# Patient Record
Sex: FEMALE | Race: WHITE | Employment: FULL TIME | URBAN - METROPOLITAN AREA
[De-identification: names, ages, dates, MRNs, and addresses within clinical notes are randomized per-mention and may not be internally consistent; named-entity substitution may affect disease eponyms.]

---

## 2018-10-31 ENCOUNTER — HOSPITAL ENCOUNTER (OUTPATIENT)
Age: 58
Discharge: HOME | End: 2018-11-02
Payer: COMMERCIAL

## 2018-10-31 PROCEDURE — G0123 SCREEN CERV/VAG THIN LAYER: HCPCS

## 2018-10-31 PROCEDURE — 87624 HPV HI-RISK TYP POOLED RSLT: CPT

## 2018-11-07 LAB
CORRESPONDING PAP CASE #: NORMAL
HPV, HIGH RISK: NEGATIVE

## 2019-05-10 ENCOUNTER — HOSPITAL ENCOUNTER (OUTPATIENT)
Dept: GENERAL RADIOLOGY | Age: 59
Discharge: HOME | End: 2019-05-12
Payer: COMMERCIAL

## 2019-05-10 ENCOUNTER — HOSPITAL ENCOUNTER (OUTPATIENT)
Age: 59
Discharge: HOME | End: 2019-05-12
Payer: COMMERCIAL

## 2019-05-10 DIAGNOSIS — M54.5 LOW BACK PAIN, UNSPECIFIED BACK PAIN LATERALITY, UNSPECIFIED CHRONICITY, WITH SCIATICA PRESENCE UNSPECIFIED: ICD-10-CM

## 2019-05-10 PROCEDURE — 72100 X-RAY EXAM L-S SPINE 2/3 VWS: CPT

## 2020-09-29 ENCOUNTER — HOSPITAL ENCOUNTER (OUTPATIENT)
Age: 60
Discharge: HOME | End: 2020-10-01
Payer: COMMERCIAL

## 2020-09-29 PROCEDURE — 87624 HPV HI-RISK TYP POOLED RSLT: CPT

## 2020-09-29 PROCEDURE — G0123 SCREEN CERV/VAG THIN LAYER: HCPCS

## 2020-10-03 LAB
HPV SAMPLE: NORMAL
HPV TYPE 16: NOT DETECTED
HPV TYPE 18: NOT DETECTED
HPV, HIGH RISK OTHER: NOT DETECTED
INTERPRETATION: NORMAL
SOURCE: NORMAL

## 2021-10-26 ENCOUNTER — TELEPHONE (OUTPATIENT)
Dept: FAMILY MEDICINE CLINIC | Age: 61
End: 2021-10-26

## 2021-10-26 NOTE — TELEPHONE ENCOUNTER
----- Message from [employer] / [employer] sent at 10/26/2021 12:04 PM EDT -----  Subject: Appointment Request    Reason for Call: New Patient Request Appointment    QUESTIONS  Type of Appointment? New Patient/New to Provider  Reason for appointment request? No appointments available during search  Additional Information for Provider? pt called in with concerns of   establishing care with dr. Mary Whitman on the website it states taking   new patient however when I did search there is no availablity and pt is   upset about that. .. pt can be reached at [phone]   ---------------------------------------------------------------------------  --------------  [address]  What is the best way for the office to contact you? OK to leave message on   voicemail  Preferred Call Back Phone Number? [phone]  ---------------------------------------------------------------------------  --------------  SCRIPT ANSWERS  Relationship to Patient? Self  Specialty Confirmation? Primary Care  Is this the first appointment to establish care for a ? No  Have you been diagnosed with, awaiting test results for, or told that you   are suspected of having COVID-19 (Coronavirus)? (If patient has tested   negative or was tested as a requirement for work, school, or travel and   not based on symptoms, answer no)? No  Within the past two weeks have you developed any of the following symptoms   (answer no if symptoms have been present longer than 2 weeks or began   more than 2 weeks ago)? Fever or Chills, Cough, Shortness of breath or   difficulty breathing, Loss of taste or smell, Sore throat, Nasal   congestion, Sneezing or runny nose, Fatigue or generalized body aches   (answer no if pain is specific to a body part e.g. back pain), Diarrhea,   Headache? No  Have you had close contact with someone with COVID-19 in the last 14 days? No  (Service Expert - click yes below to proceed with [employer] As Usual   Scheduling)?  Yes

## 2021-10-26 NOTE — TELEPHONE ENCOUNTER
----- Message from Jacinto Reinoso sent at 10/26/2021 10:02 AM EDT -----  Subject: Appointment Request    Reason for Call: New Patient Request Appointment    QUESTIONS  Type of Appointment? New Patient/New to Provider  Reason for appointment request? Requested Provider unavailable - Dr Castelan Reason for Provider? Pt is going to be a new patient. She   is requesting an appt with Dr. Sharita Bourgeois at the end of December. She will be   making an appt with her spouse, Pierre Gutierrez. They will need to   come together as English is his second language. ---------------------------------------------------------------------------  --------------  [facility] [place] INFO  What is the best way for the office to contact you? OK to leave message on   voicemail  Preferred Call Back Phone Number? [phone]  ---------------------------------------------------------------------------  --------------  SCRIPT ANSWERS  Relationship to Patient? Self  Specialty Confirmation? Primary Care  Is this the first appointment to establish care for a ? No  Have you been diagnosed with, awaiting test results for, or told that you   are suspected of having COVID-19 (Coronavirus)? (If patient has tested   negative or was tested as a requirement for work, school, or travel and   not based on symptoms, answer no)? No  Within the past two weeks have you developed any of the following symptoms   (answer no if symptoms have been present longer than 2 weeks or began   more than 2 weeks ago)? Fever or Chills, Cough, Shortness of breath or   difficulty breathing, Loss of taste or smell, Sore throat, Nasal   congestion, Sneezing or runny nose, Fatigue or generalized body aches   (answer no if pain is specific to a body part e.g. back pain), Diarrhea,   Headache? No  Have you had close contact with someone with COVID-19 in the last 14 days? No  (Service Expert - click yes below to proceed with [employer] As Usual   Scheduling)?  Yes  Have you been diagnosed with, awaiting test results for, or told that you   are suspected of having COVID-19 (Coronavirus)? (If patient has tested   negative or was tested as a requirement for work, school, or travel and   not based on symptoms, answer no)? No  Within the past two weeks have you developed any of the following symptoms   (answer no if symptoms have been present longer than 2 weeks or began   more than 2 weeks ago)? Fever or Chills, Cough, Shortness of breath or   difficulty breathing, Loss of taste or smell, Sore throat, Nasal   congestion, Sneezing or runny nose, Fatigue or generalized body aches   (answer no if pain is specific to a body part e.g. back pain), Diarrhea,   Headache? No  Have you had close contact with someone with COVID-19 in the last 14 days? No  (Service Expert - click yes below to proceed with [employer] As Usual   Scheduling)?  Yes

## 2021-10-27 NOTE — TELEPHONE ENCOUNTER
Per Dr Joe Carroll: Patient can be scheduled with another provider     Called and informed pt. Provided her with names of providers accepting new patients and pt says she'll do her research and call back if interested.

## 2023-02-06 SDOH — HEALTH STABILITY: PHYSICAL HEALTH: ON AVERAGE, HOW MANY MINUTES DO YOU ENGAGE IN EXERCISE AT THIS LEVEL?: 40 MIN

## 2023-02-06 SDOH — HEALTH STABILITY: PHYSICAL HEALTH: ON AVERAGE, HOW MANY DAYS PER WEEK DO YOU ENGAGE IN MODERATE TO STRENUOUS EXERCISE (LIKE A BRISK WALK)?: 3 DAYS

## 2023-02-06 ASSESSMENT — SOCIAL DETERMINANTS OF HEALTH (SDOH)
WITHIN THE LAST YEAR, HAVE TO BEEN RAPED OR FORCED TO HAVE ANY KIND OF SEXUAL ACTIVITY BY YOUR PARTNER OR EX-PARTNER?: NO
WITHIN THE LAST YEAR, HAVE YOU BEEN KICKED, HIT, SLAPPED, OR OTHERWISE PHYSICALLY HURT BY YOUR PARTNER OR EX-PARTNER?: NO
WITHIN THE LAST YEAR, HAVE YOU BEEN AFRAID OF YOUR PARTNER OR EX-PARTNER?: NO
WITHIN THE LAST YEAR, HAVE YOU BEEN HUMILIATED OR EMOTIONALLY ABUSED IN OTHER WAYS BY YOUR PARTNER OR EX-PARTNER?: NO

## 2023-02-09 ENCOUNTER — OFFICE VISIT (OUTPATIENT)
Dept: FAMILY MEDICINE CLINIC | Age: 63
End: 2023-02-09
Payer: COMMERCIAL

## 2023-02-09 VITALS
OXYGEN SATURATION: 99 % | HEIGHT: 66 IN | DIASTOLIC BLOOD PRESSURE: 72 MMHG | BODY MASS INDEX: 24.65 KG/M2 | WEIGHT: 153.4 LBS | TEMPERATURE: 97.3 F | HEART RATE: 72 BPM | SYSTOLIC BLOOD PRESSURE: 142 MMHG

## 2023-02-09 DIAGNOSIS — E06.3 HYPOTHYROIDISM DUE TO HASHIMOTO'S THYROIDITIS: ICD-10-CM

## 2023-02-09 DIAGNOSIS — Z91.89 AT HIGH RISK FOR BREAST CANCER: ICD-10-CM

## 2023-02-09 DIAGNOSIS — Z00.00 HEALTHCARE MAINTENANCE: ICD-10-CM

## 2023-02-09 DIAGNOSIS — E03.8 HYPOTHYROIDISM DUE TO HASHIMOTO'S THYROIDITIS: ICD-10-CM

## 2023-02-09 DIAGNOSIS — J45.20 MILD INTERMITTENT ASTHMA WITHOUT COMPLICATION: ICD-10-CM

## 2023-02-09 DIAGNOSIS — E55.9 VITAMIN D DEFICIENCY, UNSPECIFIED: ICD-10-CM

## 2023-02-09 DIAGNOSIS — K58.1 IRRITABLE BOWEL SYNDROME WITH CONSTIPATION: ICD-10-CM

## 2023-02-09 DIAGNOSIS — I10 ESSENTIAL HYPERTENSION: ICD-10-CM

## 2023-02-09 DIAGNOSIS — Z12.31 ENCOUNTER FOR SCREENING MAMMOGRAM FOR BREAST CANCER: ICD-10-CM

## 2023-02-09 DIAGNOSIS — Z76.89 ENCOUNTER TO ESTABLISH CARE: Primary | ICD-10-CM

## 2023-02-09 DIAGNOSIS — K21.9 GASTROESOPHAGEAL REFLUX DISEASE, UNSPECIFIED WHETHER ESOPHAGITIS PRESENT: ICD-10-CM

## 2023-02-09 DIAGNOSIS — Z12.11 SCREEN FOR COLON CANCER: ICD-10-CM

## 2023-02-09 DIAGNOSIS — E78.5 HYPERLIPIDEMIA, UNSPECIFIED HYPERLIPIDEMIA TYPE: ICD-10-CM

## 2023-02-09 DIAGNOSIS — Z00.00 ENCOUNTER FOR PREVENTIVE CARE: ICD-10-CM

## 2023-02-09 PROBLEM — H52.4 PRESBYOPIA: Status: ACTIVE | Noted: 2023-02-09

## 2023-02-09 PROBLEM — H04.129 TEAR FILM INSUFFICIENCY: Status: ACTIVE | Noted: 2023-02-09

## 2023-02-09 LAB
BACTERIA: NORMAL /HPF
BILIRUBIN URINE: NEGATIVE
BLOOD, URINE: NORMAL
CLARITY: CLEAR
COLOR: YELLOW
GLUCOSE URINE: NEGATIVE MG/DL
KETONES, URINE: NEGATIVE MG/DL
LEUKOCYTE ESTERASE, URINE: NEGATIVE
NITRITE, URINE: NEGATIVE
PH UA: 6.5 (ref 5–9)
PROTEIN UA: NEGATIVE MG/DL
RBC UA: NORMAL /HPF (ref 0–2)
SPECIFIC GRAVITY UA: <=1.005 (ref 1–1.03)
UROBILINOGEN, URINE: 0.2 E.U./DL
WBC UA: NORMAL /HPF (ref 0–5)

## 2023-02-09 PROCEDURE — 3077F SYST BP >= 140 MM HG: CPT | Performed by: FAMILY MEDICINE

## 2023-02-09 PROCEDURE — 3078F DIAST BP <80 MM HG: CPT | Performed by: FAMILY MEDICINE

## 2023-02-09 PROCEDURE — 99205 OFFICE O/P NEW HI 60 MIN: CPT | Performed by: FAMILY MEDICINE

## 2023-02-09 RX ORDER — LEVOTHYROXINE SODIUM 75 MCG
75 TABLET ORAL DAILY
Qty: 30 TABLET | Refills: 3 | Status: SHIPPED | OUTPATIENT
Start: 2023-02-09

## 2023-02-09 RX ORDER — LACTULOSE 10 G/15ML
20 SOLUTION ORAL 3 TIMES DAILY
COMMUNITY
End: 2023-02-09 | Stop reason: ALTCHOICE

## 2023-02-09 RX ORDER — LINACLOTIDE 290 UG/1
290 CAPSULE, GELATIN COATED ORAL
COMMUNITY
End: 2023-02-09 | Stop reason: SDUPTHER

## 2023-02-09 RX ORDER — LEVOTHYROXINE SODIUM 75 MCG
1 TABLET ORAL DAILY
COMMUNITY
Start: 2022-12-04 | End: 2023-02-09 | Stop reason: SDUPTHER

## 2023-02-09 RX ORDER — MONTELUKAST SODIUM 10 MG/1
10 TABLET ORAL DAILY
Qty: 30 TABLET | Refills: 3 | Status: SHIPPED | OUTPATIENT
Start: 2023-02-09

## 2023-02-09 RX ORDER — OMEPRAZOLE 20 MG/1
20 CAPSULE, DELAYED RELEASE ORAL DAILY
Qty: 30 CAPSULE | Refills: 3 | Status: SHIPPED | OUTPATIENT
Start: 2023-02-09

## 2023-02-09 RX ORDER — OMEPRAZOLE 40 MG/1
1 CAPSULE, DELAYED RELEASE ORAL DAILY
COMMUNITY
Start: 2022-11-23 | End: 2023-02-09 | Stop reason: SDUPTHER

## 2023-02-09 RX ORDER — HYDROCHLOROTHIAZIDE 12.5 MG/1
12.5 TABLET ORAL DAILY
COMMUNITY
End: 2023-02-09

## 2023-02-09 RX ORDER — LOSARTAN POTASSIUM AND HYDROCHLOROTHIAZIDE 12.5; 1 MG/1; MG/1
1 TABLET ORAL DAILY
Qty: 30 TABLET | Refills: 3 | Status: SHIPPED | OUTPATIENT
Start: 2023-02-09

## 2023-02-09 RX ORDER — LOSARTAN POTASSIUM 50 MG/1
1 TABLET ORAL DAILY
COMMUNITY
Start: 2022-11-19 | End: 2023-02-09

## 2023-02-09 RX ORDER — LINACLOTIDE 290 UG/1
290 CAPSULE, GELATIN COATED ORAL
Qty: 30 CAPSULE | Refills: 3 | Status: SHIPPED | OUTPATIENT
Start: 2023-02-09

## 2023-02-09 RX ORDER — MONTELUKAST SODIUM 10 MG/1
1 TABLET ORAL DAILY
COMMUNITY
Start: 2018-08-02 | End: 2023-02-09 | Stop reason: SDUPTHER

## 2023-02-09 RX ORDER — ESOMEPRAZOLE MAGNESIUM 40 MG/1
1 CAPSULE, DELAYED RELEASE ORAL DAILY
COMMUNITY
End: 2023-02-09

## 2023-02-09 SDOH — ECONOMIC STABILITY: FOOD INSECURITY: WITHIN THE PAST 12 MONTHS, YOU WORRIED THAT YOUR FOOD WOULD RUN OUT BEFORE YOU GOT MONEY TO BUY MORE.: NEVER TRUE

## 2023-02-09 SDOH — ECONOMIC STABILITY: FOOD INSECURITY: WITHIN THE PAST 12 MONTHS, THE FOOD YOU BOUGHT JUST DIDN'T LAST AND YOU DIDN'T HAVE MONEY TO GET MORE.: NEVER TRUE

## 2023-02-09 SDOH — ECONOMIC STABILITY: HOUSING INSECURITY
IN THE LAST 12 MONTHS, WAS THERE A TIME WHEN YOU DID NOT HAVE A STEADY PLACE TO SLEEP OR SLEPT IN A SHELTER (INCLUDING NOW)?: NO

## 2023-02-09 SDOH — ECONOMIC STABILITY: INCOME INSECURITY: HOW HARD IS IT FOR YOU TO PAY FOR THE VERY BASICS LIKE FOOD, HOUSING, MEDICAL CARE, AND HEATING?: NOT HARD AT ALL

## 2023-02-09 ASSESSMENT — PATIENT HEALTH QUESTIONNAIRE - PHQ9
SUM OF ALL RESPONSES TO PHQ QUESTIONS 1-9: 0
SUM OF ALL RESPONSES TO PHQ9 QUESTIONS 1 & 2: 0
SUM OF ALL RESPONSES TO PHQ QUESTIONS 1-9: 0
2. FEELING DOWN, DEPRESSED OR HOPELESS: 0
SUM OF ALL RESPONSES TO PHQ QUESTIONS 1-9: 0
1. LITTLE INTEREST OR PLEASURE IN DOING THINGS: 0
SUM OF ALL RESPONSES TO PHQ QUESTIONS 1-9: 0

## 2023-02-09 ASSESSMENT — LIFESTYLE VARIABLES
HOW MANY STANDARD DRINKS CONTAINING ALCOHOL DO YOU HAVE ON A TYPICAL DAY: 3 OR 4
HOW OFTEN DO YOU HAVE A DRINK CONTAINING ALCOHOL: 2-4 TIMES A MONTH

## 2023-02-09 NOTE — PROGRESS NOTES
CC: Natali Davis is a 63 y.o. yo female here for evaluation of the following medical concerns: Establish Care (Previously saw Dr. Mirella Stewart)      HPI:    Establish care    HTN: on hctz 12.5mg; cozaar 50mg; home monitoring has been elevated as well  HLD: not on medical therapy  Hypothyroidism - on synthroid 88mcg  GERD / HH: on prilosec 40mg daily for years; hx of gastric follow through last completed; ~2001; did stop this for a couple years and was ok; after covid she restarted this again for preventive purposes; no hx of EGD  IBS with constipation: linzess does help; eats a lot of fiber; still has slow transit  Mild bronchospasm: had difficulty with deep breathing; could not get enough air and would get light-headed; started on singular which has helped her   Last c-scope 2014; Dr. Dc Sen; 10 year interval; strong FH of colon cancer and other cancers  Strong FH of cancer / breast ca  / ovarian cancer  Preventive: scattered densities; TC risk >20%; overdue on mammo  Follows with gyn Dr. Philippe Owen  Hx of partial hyster    Vitals:  BP (!) 142/72 (Site: Left Upper Arm, Position: Sitting, Cuff Size: Medium Adult)   Pulse 72   Temp 97.3 °F (36.3 °C) (Temporal)   Ht 5' 6.25\" (1.683 m)   Wt 153 lb 6.4 oz (69.6 kg)   SpO2 99%   BMI 24.57 kg/m²   Wt Readings from Last 3 Encounters:   02/09/23 153 lb 6.4 oz (69.6 kg)       Physical Exam:  Constitutional - alert, well appearing, and in no distress  Eyes - extraocular eye movements intact, left eye normal, right eye normal, no conjunctivitis noted  Neck - supple, no significant adenopathy; thyroid exam: thyroid is normal in size without nodules or tenderness  Respiratory- clear to auscultation, no wheezes, rales or rhonchi, symmetric air entry; no increased work of breathing  Cardiovascular - normal rate, regular rhythm, normal S1, S2, no murmurs, rubs, clicks or gallops;    Extremities - no edema noted  Abdomen - soft, nontender, nondistended  Musculoskeletal - no clubbing, cyanosis of extremities noted; no joint deformity or swelling noted  Skin - normal coloration and turgor, no rashes, no suspicious skin lesions noted  Neurological - alert, oriented; no obvious CN deficits, normal speech, no obvious focal findings noted  Psychiatric - normal mood, behavior, speech, dress    Assessment / Plan   Diagnosis Orders   1. Encounter to establish care        2. Essential hypertension  losartan-hydroCHLOROthiazide (HYZAAR) 100-12.5 MG per tablet    CBC with Auto Differential    Comprehensive Metabolic Panel    TSH    Urinalysis      3. Hyperlipidemia, unspecified hyperlipidemia type  Lipid Panel      4. Mild intermittent asthma without complication  montelukast (SINGULAIR) 10 MG tablet      5. Gastroesophageal reflux disease, unspecified whether esophagitis present  omeprazole (PRILOSEC) 20 MG delayed release capsule      6. Hypothyroidism due to Hashimoto's thyroiditis  SYNTHROID 75 MCG tablet      7. Irritable bowel syndrome with constipation  linaclotide (LINZESS) 290 MCG CAPS capsule      8. At high risk for breast cancer  Taya Larios NP, Breast Care, [facility])      9. Vitamin D deficiency, unspecified  Vitamin D 25 Hydroxy      10. Healthcare maintenance  HIV Screen    Hepatitis C Antibody      11. Encounter for screening mammogram for breast cancer  SVETLANA MADELIN DIGITAL SCREEN BILATERAL PER PROTOCOL      12. Screen for colon cancer  External Referral To Gastroenterology      13.  Encounter for preventive care  Lipid Panel    CBC with Auto Differential    Comprehensive Metabolic Panel    TSH    Urinalysis    HIV Screen    Hepatitis C Antibody          Labs as ordered  Increase losartan to 100mg; combine with hctz 12.5mg  Decrease prilosec to 20mg and wean completely in future  Consider PFTs in near future  Continue linzess; can add miralax as well  Referral for c-scope; consider EGD  Referral to  breast center; TM  Close f/u  Reviewed prior med records    RTO: Return in about 3 weeks (around 3/2/2023) for BP check and labs review. On this date 2/9/2023 I have spent 62 minutes reviewing previous notes, test results and face to face with the patient discussing the diagnosis and importance of compliance with the treatment plan as well as documenting on the day of the visit.         An electronic signature was used to authenticate this note.  ---- Alyssa Collins MD on 2/9/2023 at 4:32 PM

## 2023-02-10 ENCOUNTER — TELEPHONE (OUTPATIENT)
Dept: BREAST CENTER | Age: 63
End: 2023-02-10

## 2023-02-10 NOTE — TELEPHONE ENCOUNTER
Called and spoke with patient who is a new referral to the breast clinic for high risk, family history breast cancer. patient has n ot had breast imaging since 2020 but is not having any breast issues at this time. Mammogram and office visit scheduled for 3/8/2023.     Electronically signed by Moreno Winslow RN on 2/10/23 at 9:54 AM EST

## 2023-02-13 DIAGNOSIS — E03.8 HYPOTHYROIDISM DUE TO HASHIMOTO'S THYROIDITIS: ICD-10-CM

## 2023-02-13 DIAGNOSIS — I10 ESSENTIAL HYPERTENSION: ICD-10-CM

## 2023-02-13 DIAGNOSIS — E06.3 HYPOTHYROIDISM DUE TO HASHIMOTO'S THYROIDITIS: ICD-10-CM

## 2023-02-13 RX ORDER — LOSARTAN POTASSIUM AND HYDROCHLOROTHIAZIDE 12.5; 1 MG/1; MG/1
1 TABLET ORAL DAILY
Qty: 90 TABLET | Refills: 1 | Status: SHIPPED | OUTPATIENT
Start: 2023-02-13

## 2023-02-13 RX ORDER — LEVOTHYROXINE SODIUM 75 MCG
75 TABLET ORAL DAILY
Qty: 90 TABLET | Refills: 1 | Status: SHIPPED | OUTPATIENT
Start: 2023-02-13

## 2023-02-13 NOTE — TELEPHONE ENCOUNTER
Janeen Allen from [facility] called and left a vm stating that pt's plan is asking for 90 days per fill on pt's Losartan and Synthroid. Janeen Allen wants to know if you'd be okay with doing this and would like for us to call him back.     Please advise    Sukhdev Bills tech: [phone]  Ref [id number]      Electronically signed by Lawyer Ricardo MA on 2/13/2023 at 11:24 AM

## 2023-02-13 NOTE — TELEPHONE ENCOUNTER
Pt agreed to 90 days, meds pended.     Electronically signed by Katalina Dalton MA on 2/13/2023 at 2:30 PM

## 2023-02-27 NOTE — PROGRESS NOTES
Subjective:   William Corea 1960     HPI    PCP: Rosario Bellamy MD, Gynecologist: Dr. Miguel Portillo. Zackery Kate is a 63 y.o. beth woman with a PMH of hypothyroidism, hypertension, hyperlipidemia, asthma, irritable bowel, and GERD. Presents today without breast related complaints to discuss her predisposition to breast cancer based on gender and strong family history. She has no personal history of cancer. Family history of cancer: Mother with breast cancer age 54; Uterine cancer age 77  Sister with Breast cancer age 61; Ovarian cancer age 62  Maternal aunt with breast cancer   Maternal aunt wih breast cancer   Maternal uncle with pancreatic cancer   Maternal uncle with colon cancer   Maternal uncle with colon cancer  [employer] Ancestry: No.  History of prior thoracic radiation therapy:  No    Breast Imaging: Fany Pierre IBS: 25% Grade B breast density. 2017 bilateral screening mammogram: Negative, BI-RADS 1.  2022 bilateral screening mammogram: Negative, BI-RADS 1. Breast Biopsies:  Denies    Obstetric history:  Menarche 14. Menopause due to partial hysterectomy-still has ovaries. HRT briefly, prior to her hysterectomy. . First live birth age 27. She did not breast feed. Bra size 36C . She is a non-smoker. 2 cups of coffee per day.     Past Surgical History:   Procedure Laterality Date     SECTION      COLONOSCOPY      ENDOMETRIAL ABLATION      PARTIAL HYSTERECTOMY (CERVIX NOT REMOVED)      TUBAL LIGATION         Current Outpatient Medications   Medication Sig Dispense Refill    losartan-hydroCHLOROthiazide (HYZAAR) 100-12.5 MG per tablet Take 1 tablet by mouth daily 90 tablet 1    SYNTHROID 75 MCG tablet Take 1 tablet by mouth daily 90 tablet 1    Flaxseed, Linseed, (FLAXSEED OIL PO) Take by mouth      Calcium Carbonate (CALTRATE 600 PO) Take by mouth      Cholecalciferol (VITAMIN D3) 125 MCG (5000 UT) TABS Take 1 tablet by mouth daily      linaclotide (LINZESS) 290 MCG CAPS capsule Take 1 capsule by mouth every morning (before breakfast) 30 capsule 3    montelukast (SINGULAIR) 10 MG tablet Take 1 tablet by mouth daily 30 tablet 3    omeprazole (PRILOSEC) 20 MG delayed release capsule Take 1 capsule by mouth daily 30 capsule 3     No current facility-administered medications for this visit. No Known Allergies    Family History   Problem Relation Age of Onset    Breast Cancer Mother 54        post menopausal    Uterine Cancer Mother     Breast Cancer Sister     Ovarian Cancer Sister     Breast Cancer Maternal Aunt     Breast Cancer Maternal Aunt     Pancreatic Cancer Maternal Uncle     Colon Cancer Maternal Uncle     Colon Cancer Maternal Uncle        Social History     Socioeconomic History    Marital status:      Spouse name: Not on file    Number of children: Not on file    Years of education: Not on file    Highest education level: Not on file   Occupational History    Not on file   Tobacco Use    Smoking status: Former     Packs/day: 0.25     Years: 0.00     Pack years: 0.00     Types: Cigarettes     Start date: 1979     Quit date: 1979     Years since quittin.7    Smokeless tobacco: Never   Substance and Sexual Activity    Alcohol use: Not on file    Drug use: Not on file    Sexual activity: Not on file   Other Topics Concern    Not on file   Social History Narrative    Not on file     Social Determinants of Health     Financial Resource Strain: Low Risk     Difficulty of Paying Living Expenses: Not hard at all   Food Insecurity: No Food Insecurity    Worried About [address] in the Last Year: Never true    [facility] in the Last Year: Never true   Transportation Needs: Unknown    Lack of Transportation (Medical): Not on file    Lack of Transportation (Non-Medical):  No   Physical Activity: Insufficiently Active    Days of Exercise per Week: 3 days    Minutes of Exercise per Session: 40 min   Stress: Not on file   Social Connections: Not on file   Intimate Partner Violence: Not At Risk    Fear of Current or Ex-Partner: No    Emotionally Abused: No    Physically Abused: No    Sexually Abused: No   Housing Stability: Unknown    Unable to Pay for Housing in the Last Year: Not on file    Number of Places Lived in the Last Year: Not on file    Unstable Housing in the Last Year: No       Occupation: . Enjoys spending time with family    Review of Systems   Constitutional:  Negative for activity change, appetite change, fatigue and unexpected weight change. She generally feels well. No breast related complaints on this visit. HENT: Negative. Respiratory:  Negative for cough, shortness of breath and wheezing. Cardiovascular:  Negative for chest pain, palpitations and leg swelling. She reports asymptomatic, intermittent palpitations which will resolve spontaneously. Gastrointestinal: Negative. Last colonoscopy (within 10 years); Negative for polyps. Genitourinary:         Hysterectomy; Ovaries remain intact. Musculoskeletal:  Negative for gait problem and joint swelling. Neurological:  Negative for tremors, seizures, syncope, speech difficulty, light-headedness and headaches. Hematological:  Negative for adenopathy. Does not bruise/bleed easily. Psychiatric/Behavioral:  Negative for confusion. The patient is not nervous/anxious. Patient denies previous history of DVT/PE. Objective:   Physical Exam  Vitals and nursing note reviewed. Constitutional:       Appearance: Normal appearance. She is normal weight. Comments: Great performance status. She appears younger than her stated age. No apparent distress. HENT:      Head: Normocephalic and atraumatic. Eyes:      Extraocular Movements: Extraocular movements intact.       Conjunctiva/sclera: Conjunctivae normal.   Cardiovascular:      Rate and Rhythm: Normal rate and regular rhythm. Heart sounds: Normal heart sounds. Pulmonary:      Effort: Pulmonary effort is normal.      Breath sounds: Normal breath sounds. Chest:      Chest wall: No mass. Breasts:     Breasts are symmetrical.      Right: Normal. No swelling, bleeding, inverted nipple, mass, nipple discharge, skin change or tenderness. Left: Normal. No swelling, bleeding, inverted nipple, mass, nipple discharge, skin change or tenderness. Comments: Breasts are supple bilaterally. No skin dimpling or puckering. No nipple discharge. No clinically suspicious lumps nodules or masses appreciated. No axillary lymphadenopathy. No evidence of malignancy or disease on clinical exam.    Abdominal:      Palpations: Abdomen is soft. Musculoskeletal:         General: Normal range of motion. Cervical back: Normal range of motion and neck supple. Skin:     General: Skin is warm and dry. Neurological:      General: No focal deficit present. Mental Status: She is alert and oriented to person, place, and time. Psychiatric:         Mood and Affect: Mood normal.         Thought Content: Thought content normal.         Judgment: Judgment normal.       Assessment:    Shaun Constantino is a 63 y.o. beth woman who presents today for further discussion regarding her predisposition to breast cancer based on gender, and maternal family history of breast, ovarian, pancreatic, and colon cancer. Breast Imaging: Dilia Jeffers IBS: 25% grade B breast density. 03/13/2017 bilateral screening mammogram: Negative, BI-RADS 1.  03/08/2023 bilateral screening mammogram: Negative, BI-RADS 1. Her clinical exam today is unremarkable and without evidence of malignancy. We reviewed her breast imaging today for educational (not interpretive) purposes on this visit.     We discussed breast anatomy, breast density as assigned by radiology, the bi-rads result, and reviewed the purpose of any biopsy or surgical clips (did not verify placement) noted on imaging. In addition, we discussed any changes on imaging as they relate to post procedure/post treatment. It was clearly stated to [facility] that interpretation of imaging and the final result of imaging is at the discretion of the reading radiologist.     During pt's visit today, we reviewed her IBS risk score as well as NCCN guidelines for enhanced surveillance commendations for those nodules who have an IBS score of 20% or greater including:     Clinical encounter every 6-12 months   Annual screening mammogram     Annual breast MRI     Based on her family history and in accordance with these guidelines, it is my recommendation that she return to the office in 6 months with an MRI of the bilateral breast prior. We discussed MRI of breast and we reviewed potential risks of Gadolinium and that risks, if any, are not fully understood. Gadolinium is retained for months or years and brain, bone, and other organs. The FDA states there is no clinical evidence that directly links gadolinium retention to adverse effects in patients with normal kidney function. Nephrogenic systemic fibrosis has occurred in patients with impaired elimination of gadolinium based contrast agents. We reviewed that it's important to have blood work to ensure adequate kidney function prior to MRI. We discussed prior authorization does not cover the co-pay, and once authorized, she should contact her insurance company to clearly understand the financial impact of testing. Genetic Testing: She reports her sister had genetic testing which was negative; her daughter also had negative genetic testing. Chemoprevention:  We discussed based on her family history and her post-menopausal state, she would be eligible for chemoprevention with an Aromatase Inhibitor such as Anastrozole.   Postmenopausal women at high risk for developing breast cancer--largely based on family history, can be offered Anastrozole 1 mg by mouth once daily for 5 years.  In addition, Anastrozole taken for 5 years maintained a preventive effect for at least an additional 7 years after stopping the drug in the FANI-II trial, which included nearly 4,000 subjects. We fully reviewed the side effects of anastrozole including hot flashes, mood swings, potential for serious cardiovascular side effects, as well as thinning of the bones. She already takes calcium and vitamin D daily. Was provided written information on chemoprevention with anastrozole on this visit. At this time, we will plan to see her in 6 months. She is hesitant to agree upon breast MRI imaging, stating \"I have watched my mother and sister go through this and I would not want to go through what they did\". We discussed the goal of enhanced screening is to detect any breast related changes early. I have offered her an appointment with any of my physician practice partners, Dr. Abhilash Moreira, Dr. Galen Patten, or Dr. Hector Mota. I have also offered her a referral to plastics and reconstructive surgery in the event she would like to discuss prophylactic surgery. At this time, she has declined both offers and will let us know in the event she changes her mind. Family history of colon cancer, she reports she is up to date on her screening colonoscopies.    ]    Plan:    Continue monthly breast self examination; detailed instructions reviewed today. Bring any changes to your physician's attention. Continue healthy diet and exercise routinely as tolerated to maintain IBW. Exercise recommendations include 150-300 min/week moderate intensity activity plus 2x weekly resistance exercise (source ASBS 2022). Avoid alcohol. Limit caffeine intake. Repeat mammogram 1 year. Continue follow up with Primary Care, Gynecology, and all specialties as directed. Provide information on Chemoprevention with Anastrozole from Up-To-Date. RTC 6 months MRI bilateral breast prior.       I spent a total of 45 minutes on the date of the service which included preparing to see the patient, face-to-face patient care, completing clinical documentation, obtaining and/or reviewing separately obtained history, performing a medically appropriate examination, counseling and educating the patient/family/caregiver, ordering medications, tests, or procedures, communicating with other HCPs (not separately reported), independently interpreting results (not separately reported), communicating results to the patient/family/caregiver and care coordination (not separately reported). This document is generated, in part, by voice recognition software and thus syntax and grammatical errors are possible. Randell Moreira, RN, MSN, APRN-CNP, [address]  Advanced Oncology Certified Nurse Practitioner  Department of Breast Surgery  [facility]/  [facility] in collaboration with Dr. Mala Trejo.  [place]/Dr. Timmy Bautista/Dr. Wu Gomez APRN-CNP

## 2023-03-07 ENCOUNTER — OFFICE VISIT (OUTPATIENT)
Dept: FAMILY MEDICINE CLINIC | Age: 63
End: 2023-03-07
Payer: COMMERCIAL

## 2023-03-07 VITALS
TEMPERATURE: 97.3 F | HEART RATE: 67 BPM | BODY MASS INDEX: 24.59 KG/M2 | HEIGHT: 66 IN | SYSTOLIC BLOOD PRESSURE: 130 MMHG | WEIGHT: 153 LBS | DIASTOLIC BLOOD PRESSURE: 80 MMHG | OXYGEN SATURATION: 96 %

## 2023-03-07 DIAGNOSIS — E87.5 HYPERKALEMIA: ICD-10-CM

## 2023-03-07 DIAGNOSIS — I10 ESSENTIAL HYPERTENSION: Primary | ICD-10-CM

## 2023-03-07 DIAGNOSIS — E03.8 HYPOTHYROIDISM DUE TO HASHIMOTO'S THYROIDITIS: ICD-10-CM

## 2023-03-07 DIAGNOSIS — Z23 NEED FOR VACCINATION: ICD-10-CM

## 2023-03-07 DIAGNOSIS — E06.3 HYPOTHYROIDISM DUE TO HASHIMOTO'S THYROIDITIS: ICD-10-CM

## 2023-03-07 DIAGNOSIS — E78.5 HYPERLIPIDEMIA, UNSPECIFIED HYPERLIPIDEMIA TYPE: ICD-10-CM

## 2023-03-07 DIAGNOSIS — K21.9 GASTROESOPHAGEAL REFLUX DISEASE, UNSPECIFIED WHETHER ESOPHAGITIS PRESENT: ICD-10-CM

## 2023-03-07 PROCEDURE — 90677 PCV20 VACCINE IM: CPT | Performed by: FAMILY MEDICINE

## 2023-03-07 PROCEDURE — 99214 OFFICE O/P EST MOD 30 MIN: CPT | Performed by: FAMILY MEDICINE

## 2023-03-07 PROCEDURE — 90471 IMMUNIZATION ADMIN: CPT | Performed by: FAMILY MEDICINE

## 2023-03-07 PROCEDURE — 3075F SYST BP GE 130 - 139MM HG: CPT | Performed by: FAMILY MEDICINE

## 2023-03-07 PROCEDURE — 3079F DIAST BP 80-89 MM HG: CPT | Performed by: FAMILY MEDICINE

## 2023-03-07 NOTE — PROGRESS NOTES
CC: Kristopher Hubbard is a 63 y.o. yo female is here for evaluation evaluation for the following chronic medical concerns: Blood Pressure Check (3 week f/u) and Blood Work      HPI:        HTN: on hctz 12.5mg; cozaar now 100mg; hx of hyperkalemia; last K borderline prior to starting increase in cozaar  HLD: ASCVD 3.4%; not on medical therapy  Hypothyroidism - on synthroid 88mcg; Mild elevated TSH: Put on some weight over the past 5 years 30lbs; Not eating different; exercisign for last month and cant lose; TSH has been about same all along; higher end of normal  GERD / HH: on prilosec 40mg daily for years; hx of gastric follow through last completed; ~2001; did stop this for a couple years and was ok; after covid she restarted this again for preventive purposes; no hx of EGD; most recently did stop this completely with no rebound or recurrent symptoms; controlled off of medical therapy  IBS with constipation: linzess does help; eats a lot of fiber; still has slow transit  Mild bronchospasm: had difficulty with deep breathing; could not get enough air and would get light-headed;  controlled on singular  Last c-scope 2014; Dr. Shi Diaz; 10 year interval; strong FH of colon cancer and other cancers; prefers to wait for repeat c-scope for 10 years.   Strong FH of cancer / breast ca  / ovarian cancer  Preventive: scattered densities; TC risk >20%; overdue on mammo; planned mammo and f/u with [facility]  Follows with gyn Dr. Garrett Batch  Hx of partial hyster      Vitals:   /80   Pulse 67   Temp 97.3 °F (36.3 °C) (Temporal)   Ht 5' 6.25\" (1.683 m)   Wt 153 lb (69.4 kg)   SpO2 96%   BMI 24.51 kg/m²   Wt Readings from Last 3 Encounters:   03/07/23 153 lb (69.4 kg)   02/09/23 153 lb 6.4 oz (69.6 kg)       PE:  Constitutional - alert, well appearing, and in no distress  Eyes - extraocular eye movements intact, left eye normal, right eye normal, no conjunctivitis noted  Neck - symmetric, no obvious masses noted  Respiratory- clear to auscultation, no wheezes, rales or rhonchi, symmetric air entry; no increased work of breathing  Cardiovascular - normal rate, regular rhythm, normal S1, S2, no murmurs, rubs, clicks or gallops  Extremities - no edema noted  Abdomen - soft, nontender, nondistended  Skin - normal coloration and turgor, no rashes, no suspicious skin lesions noted  Neurological - no obvious CN deficits or focal neurological deficits  Psychiatric - alert, oriented; normal mood, behavior, speech, dress    A / P:     Diagnosis Orders   1. Essential hypertension        2. Hyperkalemia  Basic Metabolic Panel    Basic Metabolic Panel      3. Hyperlipidemia, unspecified hyperlipidemia type        4. Hypothyroidism due to Hashimoto's thyroiditis  TSH      5. Need for vaccination  Pneumococcal, PCV20, PREVNAR 21, (age 25 yrs+), IM, PF      6. Gastroesophageal reflux disease, unspecified whether esophagitis present            Repeat BMP today  Repeat TSH and BMP in 3 months as well  Remain on synthroid 88mcg for now; repeat consider 100mcg m,w,f at f/u if still elevated  Continue losartan 100mg + hctz 12.5mg for now  Remain off of prilosec  Consider PFTs in near future  Continue linzess; can add miralax as well  Referral for c-scope; consider EGD; prefers to wait full 10 years for repeat  Continue with plans to f/u with  breast center; TM      RTO: Return in about 3 months (around 6/7/2023) for chronic disease / routine f/u.       An electronic signature was used to authenticate this note.  ---- Panda Doan MD on 3/7/2023 at 12:21 PM

## 2023-03-08 ENCOUNTER — OFFICE VISIT (OUTPATIENT)
Dept: BREAST CENTER | Age: 63
End: 2023-03-08
Payer: COMMERCIAL

## 2023-03-08 ENCOUNTER — HOSPITAL ENCOUNTER (OUTPATIENT)
Dept: GENERAL RADIOLOGY | Age: 63
Discharge: HOME | End: 2023-03-10
Payer: COMMERCIAL

## 2023-03-08 VITALS
HEIGHT: 66 IN | HEART RATE: 62 BPM | TEMPERATURE: 97.8 F | OXYGEN SATURATION: 98 % | WEIGHT: 153 LBS | DIASTOLIC BLOOD PRESSURE: 80 MMHG | SYSTOLIC BLOOD PRESSURE: 122 MMHG | RESPIRATION RATE: 14 BRPM | BODY MASS INDEX: 24.59 KG/M2

## 2023-03-08 VITALS — BODY MASS INDEX: 23.54 KG/M2 | WEIGHT: 150 LBS | HEIGHT: 67 IN

## 2023-03-08 DIAGNOSIS — Z91.89 AT HIGH RISK FOR BREAST CANCER: ICD-10-CM

## 2023-03-08 DIAGNOSIS — Z80.3 FAMILY HISTORY OF BREAST CANCER IN SISTER: ICD-10-CM

## 2023-03-08 DIAGNOSIS — Z80.3 FAMILY HISTORY OF BREAST CANCER IN MOTHER: Primary | ICD-10-CM

## 2023-03-08 DIAGNOSIS — Z12.31 ENCOUNTER FOR SCREENING MAMMOGRAM FOR BREAST CANCER: ICD-10-CM

## 2023-03-08 DIAGNOSIS — Z01.818 PRE-OP TESTING: ICD-10-CM

## 2023-03-08 PROCEDURE — 3074F SYST BP LT 130 MM HG: CPT | Performed by: NURSE PRACTITIONER

## 2023-03-08 PROCEDURE — 77063 BREAST TOMOSYNTHESIS BI: CPT

## 2023-03-08 PROCEDURE — 99204 OFFICE O/P NEW MOD 45 MIN: CPT | Performed by: NURSE PRACTITIONER

## 2023-03-08 PROCEDURE — 3079F DIAST BP 80-89 MM HG: CPT | Performed by: NURSE PRACTITIONER

## 2023-03-08 PROCEDURE — 99203 OFFICE O/P NEW LOW 30 MIN: CPT | Performed by: NURSE PRACTITIONER

## 2023-03-08 ASSESSMENT — ENCOUNTER SYMPTOMS
COUGH: 0
SHORTNESS OF BREATH: 0
WHEEZING: 0
GASTROINTESTINAL NEGATIVE: 1

## 2023-03-08 NOTE — LETTER
March 8, 2023      Arnold Fagan MD  [address] [place] [address]  [place],  [address] [facility]      Patient: Shanti Costa   MR Number: [id number]   YOB: 1960   Date of Visit: 3/8/2023       Dear Dr. Thompson Dailey:    Thank you for referring Shanti Costa to me for consultation. Below are the relevant portions of my assessment and plan of care. As you are aware, Cassandra Bender is a 63 y.o. beth woman who presents today for further discussion regarding her predisposition to breast cancer based on gender and maternal family history of breast, ovarian, pancreatic, and colon cancer. Breast Imaging: Bev Ulloa IBS: 25% grade B breast density. 03/13/2017 bilateral screening mammogram: Negative, BI-RADS 1.  03/08/2023 bilateral screening mammogram: Negative, BI-RADS 1. Her clinical exam today is unremarkable and without evidence of malignancy. We reviewed her breast imaging today for educational (not interpretive) purposes on this visit. We discussed breast anatomy, breast density as assigned by radiology, and the bi-rads result. It was clearly stated to Cassandra Bender that interpretation of imaging and the final result of imaging is at the discretion of the reading radiologist.     During pt's visit today, we reviewed her IBS risk score as well as NCCN guidelines for enhanced surveillance commendations for those nodules who have an IBS score of 20% or greater including:      Clinical encounter every 6-12 months    Annual screening mammogram      Annual breast MRI     Based on her family history and in accordance with these guidelines, it is my recommendation that she return to the office in 6 months with an MRI of the bilateral breast prior. At this time, she is hesitant to agree upon breast MRI imaging, stating \"I have watched my mother and sister go through this and I would not want to go through what they did\". We discussed the goal of enhanced screening is to detect any breast related changes early.   I have offered her an appointment with any of my physician practice partners, Dr. Vinh Lora, Dr. Sun Thomson, or Dr. Zev Benito. I have also offered her a referral to plastics and reconstructive surgery in the event she would like to discuss prophylactic surgery. At this time, she has declined both offers and will let us know in the event she changes her mind. Genetic Testing: She reports her sister had genetic testing which was negative; her daughter also had negative genetic testing. Chemoprevention:  We discussed based on her family history and her post-menopausal state, she would be eligible for chemoprevention with an Aromatase Inhibitor such as Anastrozole. Postmenopausal women at high risk for developing breast cancer--largely based on family history, can be offered Anastrozole 1 mg by mouth once daily for 5 years. In addition, Anastrozole taken for 5 years maintained a preventive effect for at least an additional 7 years after stopping the drug in the FANI-II trial, which included nearly 4,000 subjects. We fully reviewed the side effects of anastrozole including hot flashes, mood swings, potential for serious cardiovascular side effects, as well as thinning of the bones. She already takes calcium and vitamin D daily. Was provided written information on chemoprevention with anastrozole on this visit. For her family history of colon cancer, she is up-to-date on colon cancer screening/colonoscopy. If you have questions, please do not hesitate to call me. I look forward to following Jeb Bear along with you and appreciate the opportunity to participate in her care. .    Sincerely,    Eva Black (Veda Phoenix) Carlos Manuel Yates RN, MSN, APRN-CNP, [address]  Advanced Oncology Certified Nurse Practitioner  Department of Breast Surgery  [facility] [place]/  [facility] in collaboration with Dr. Dakota Dove.  [place]/Dr. Charanjit Bautista/Dr. Yue Estevez APRN-CNP    This document is generated, in part, by voice recognition software and thus syntax and grammatical errors are possible.

## 2023-05-24 DIAGNOSIS — I10 ESSENTIAL HYPERTENSION: ICD-10-CM

## 2023-05-24 RX ORDER — LOSARTAN POTASSIUM AND HYDROCHLOROTHIAZIDE 12.5; 1 MG/1; MG/1
1 TABLET ORAL DAILY
Qty: 90 TABLET | Refills: 0 | Status: SHIPPED | OUTPATIENT
Start: 2023-05-24 | End: 2023-05-25 | Stop reason: SDUPTHER

## 2023-05-24 NOTE — TELEPHONE ENCOUNTER
Medication Refill Request    LOV 3/7/2023  NOV 6/20/2023    Lab Results   Component Value Date    CREATININE 0.8 02/09/2023

## 2023-05-25 DIAGNOSIS — I10 ESSENTIAL HYPERTENSION: ICD-10-CM

## 2023-05-25 RX ORDER — LOSARTAN POTASSIUM AND HYDROCHLOROTHIAZIDE 12.5; 1 MG/1; MG/1
1 TABLET ORAL DAILY
Qty: 30 TABLET | Refills: 0 | Status: SHIPPED | OUTPATIENT
Start: 2023-05-25

## 2023-05-25 NOTE — TELEPHONE ENCOUNTER
The patient needs her medication to go to [facility] in [place].     Medication Refill Request    LOV 3/7/2023  NOV 6/20/2023    Lab Results   Component Value Date    CREATININE 0.8 02/09/2023

## 2023-06-20 ENCOUNTER — OFFICE VISIT (OUTPATIENT)
Dept: FAMILY MEDICINE CLINIC | Age: 63
End: 2023-06-20
Payer: COMMERCIAL

## 2023-06-20 VITALS
DIASTOLIC BLOOD PRESSURE: 68 MMHG | OXYGEN SATURATION: 99 % | TEMPERATURE: 97.4 F | HEART RATE: 75 BPM | WEIGHT: 153 LBS | BODY MASS INDEX: 24.69 KG/M2 | SYSTOLIC BLOOD PRESSURE: 110 MMHG

## 2023-06-20 DIAGNOSIS — E03.9 ACQUIRED HYPOTHYROIDISM: ICD-10-CM

## 2023-06-20 DIAGNOSIS — E06.3 HYPOTHYROIDISM DUE TO HASHIMOTO'S THYROIDITIS: ICD-10-CM

## 2023-06-20 DIAGNOSIS — I10 ESSENTIAL HYPERTENSION: Primary | ICD-10-CM

## 2023-06-20 DIAGNOSIS — E03.8 HYPOTHYROIDISM DUE TO HASHIMOTO'S THYROIDITIS: ICD-10-CM

## 2023-06-20 DIAGNOSIS — E78.5 HYPERLIPIDEMIA, UNSPECIFIED HYPERLIPIDEMIA TYPE: ICD-10-CM

## 2023-06-20 DIAGNOSIS — E87.5 HYPERKALEMIA: ICD-10-CM

## 2023-06-20 DIAGNOSIS — J45.20 MILD INTERMITTENT ASTHMA WITHOUT COMPLICATION: ICD-10-CM

## 2023-06-20 DIAGNOSIS — K58.1 IRRITABLE BOWEL SYNDROME WITH CONSTIPATION: ICD-10-CM

## 2023-06-20 PROBLEM — M72.0 DUPUYTREN'S DISEASE OF PALM: Status: ACTIVE | Noted: 2021-08-02

## 2023-06-20 LAB
ANION GAP SERPL CALCULATED.3IONS-SCNC: 10 MMOL/L (ref 7–16)
BUN SERPL-MCNC: 20 MG/DL (ref 6–23)
CALCIUM SERPL-MCNC: 9.6 MG/DL (ref 8.6–10.2)
CHLORIDE SERPL-SCNC: 104 MMOL/L (ref 98–107)
CO2 SERPL-SCNC: 28 MMOL/L (ref 22–29)
CREAT SERPL-MCNC: 0.7 MG/DL (ref 0.5–1)
GLUCOSE SERPL-MCNC: 78 MG/DL (ref 74–99)
POTASSIUM SERPL-SCNC: 4.6 MMOL/L (ref 3.5–5)
SODIUM SERPL-SCNC: 142 MMOL/L (ref 132–146)
TSH SERPL-MCNC: 3.58 UIU/ML (ref 0.27–4.2)

## 2023-06-20 PROCEDURE — 3074F SYST BP LT 130 MM HG: CPT | Performed by: FAMILY MEDICINE

## 2023-06-20 PROCEDURE — 3078F DIAST BP <80 MM HG: CPT | Performed by: FAMILY MEDICINE

## 2023-06-20 PROCEDURE — 99214 OFFICE O/P EST MOD 30 MIN: CPT | Performed by: FAMILY MEDICINE

## 2023-06-20 NOTE — PROGRESS NOTES
CC: Servando Colindres is a 63 y.o. yo female is here for evaluation evaluation for the following chronic medical concerns: Hypertension (Follow-up/), Hyperlipidemia, and Hypothyroidism        HPI:    HTN: on hctz 12.5mg; cozaar now 100mg; hx of hyperkalemia; last K borderline prior to starting increase in cozaar  HLD: ASCVD 3.4%; not on medical therapy  Hypothyroidism - on synthroid 88mcg; Mild elevated TSH: Put on some weight over the past 5 years 30lbs; TSH has been about same all along; higher end of normal  GERD / HH: controlled off of medical therapy  IBS with constipation: linzess does help; eats a lot of fiber; still has slow transit  Mild bronchospasm:  controlled on singular  Last c-scope 2014; Dr. Mellissa Sibley; 10 year interval; strong FH of colon cancer and other cancers; prefers to wait for repeat c-scope for 10 years.   Strong FH of cancer / breast ca  / ovarian cancer; did have f/u with [facility]; possible MRI breast for screening  Preventive: scattered densities; TC risk >20%; overdue on mammo; planned mammo Follows with gyn Dr. Jeyson Tellez  Hx of partial hyster      Vitals:   /68   Pulse 75   Temp 97.4 °F (36.3 °C) (Temporal)   Wt 153 lb (69.4 kg)   SpO2 99%   BMI 24.69 kg/m²   Wt Readings from Last 3 Encounters:   06/20/23 153 lb (69.4 kg)   03/08/23 150 lb (68 kg)   03/08/23 153 lb (69.4 kg)       PE:  Constitutional - alert, well appearing, and in no distress  Eyes - extraocular eye movements intact, left eye normal, right eye normal, no conjunctivitis noted  Neck - symmetric, no obvious masses noted  Respiratory- clear to auscultation, no wheezes, rales or rhonchi, symmetric air entry; no increased work of breathing  Cardiovascular - normal rate, regular rhythm, normal S1, S2, no murmurs, rubs, clicks or gallops  Extremities - no edema noted  Abdomen - soft, nontender, nondistended  Skin - normal coloration and turgor, no rashes, no suspicious skin lesions noted  Neurological - no obvious CN

## 2023-06-27 DIAGNOSIS — E03.8 HYPOTHYROIDISM DUE TO HASHIMOTO'S THYROIDITIS: ICD-10-CM

## 2023-06-27 DIAGNOSIS — E06.3 HYPOTHYROIDISM DUE TO HASHIMOTO'S THYROIDITIS: ICD-10-CM

## 2023-06-27 RX ORDER — LEVOTHYROXINE SODIUM 75 MCG
TABLET ORAL
Qty: 30 TABLET | Refills: 3 | OUTPATIENT
Start: 2023-06-27

## 2023-07-06 DIAGNOSIS — E03.8 HYPOTHYROIDISM DUE TO HASHIMOTO'S THYROIDITIS: ICD-10-CM

## 2023-07-06 DIAGNOSIS — E06.3 HYPOTHYROIDISM DUE TO HASHIMOTO'S THYROIDITIS: ICD-10-CM

## 2023-07-06 DIAGNOSIS — K58.1 IRRITABLE BOWEL SYNDROME WITH CONSTIPATION: ICD-10-CM

## 2023-07-06 DIAGNOSIS — I10 ESSENTIAL HYPERTENSION: ICD-10-CM

## 2023-07-06 RX ORDER — LINACLOTIDE 290 UG/1
290 CAPSULE, GELATIN COATED ORAL
Qty: 30 CAPSULE | Refills: 3 | Status: SHIPPED | OUTPATIENT
Start: 2023-07-06

## 2023-07-06 RX ORDER — LEVOTHYROXINE SODIUM 75 MCG
75 TABLET ORAL DAILY
Qty: 90 TABLET | Refills: 1 | Status: SHIPPED | OUTPATIENT
Start: 2023-07-06

## 2023-07-06 RX ORDER — LOSARTAN POTASSIUM AND HYDROCHLOROTHIAZIDE 12.5; 1 MG/1; MG/1
1 TABLET ORAL DAILY
Qty: 30 TABLET | Refills: 0 | Status: SHIPPED | OUTPATIENT
Start: 2023-07-06

## 2023-07-06 NOTE — TELEPHONE ENCOUNTER
Medication Refill Request    LOV 6/20/2023  NOV 9/21/2023    Lab Results   Component Value Date    CREATININE 0.7 06/20/2023       Pt called in requesting refills. States meds were on hold till labs came back.  Pended

## 2023-07-09 DIAGNOSIS — J45.20 MILD INTERMITTENT ASTHMA WITHOUT COMPLICATION: ICD-10-CM

## 2023-07-10 RX ORDER — MONTELUKAST SODIUM 10 MG/1
10 TABLET ORAL DAILY
Qty: 30 TABLET | Refills: 1 | Status: SHIPPED | OUTPATIENT
Start: 2023-07-10

## 2023-07-10 NOTE — TELEPHONE ENCOUNTER
Medication Refill Request    LOV 6/20/2023  NOV 9/21/2023    Lab Results   Component Value Date    CREATININE 0.7 06/20/2023

## 2023-08-11 ENCOUNTER — TELEPHONE (OUTPATIENT)
Dept: BREAST CENTER | Age: 63
End: 2023-08-11

## 2023-08-11 NOTE — TELEPHONE ENCOUNTER
RN contacted patient to discuss moving forward with breast MRI. Patient asked if knew how much would cost to have MRI performed. RN advised patient that she would need to contact her insurance company to discuss that information. Patient states she will call them and if its to expensive she doesn't wish to continue. RN advised we would set reminder for in a couple weeks and check back with her. Patient verbalized understanding.          Electronically signed by Bruce Phan RN on 8/11/23 at 8:13 AM EDT

## 2023-08-11 NOTE — TELEPHONE ENCOUNTER
----- Message from Abraham Bailon RN sent at 3/8/2023  2:15 PM EST -----  Patient is due for breast MRI in Sept. Please call and verify patient insurance hasnt changed and remind to have lab work drawn. Patient doesn't cycle as she had a hysterectomy. Patient last seen 3/8/23. Patient needs FU once MRI is scheduled with NP.       Electronically signed by Abraham Bailon RN on 3/8/23 at 2:16 PM EST

## 2023-08-16 DIAGNOSIS — E03.8 HYPOTHYROIDISM DUE TO HASHIMOTO'S THYROIDITIS: ICD-10-CM

## 2023-08-16 DIAGNOSIS — E06.3 HYPOTHYROIDISM DUE TO HASHIMOTO'S THYROIDITIS: ICD-10-CM

## 2023-08-17 RX ORDER — LEVOTHYROXINE SODIUM 75 MCG
75 TABLET ORAL DAILY
Qty: 90 TABLET | Refills: 0 | Status: SHIPPED | OUTPATIENT
Start: 2023-08-17

## 2023-08-21 DIAGNOSIS — E06.3 HYPOTHYROIDISM DUE TO HASHIMOTO'S THYROIDITIS: ICD-10-CM

## 2023-08-21 DIAGNOSIS — E03.8 HYPOTHYROIDISM DUE TO HASHIMOTO'S THYROIDITIS: ICD-10-CM

## 2023-08-21 RX ORDER — LEVOTHYROXINE SODIUM 0.07 MG/1
75 TABLET ORAL DAILY
Qty: 90 TABLET | Refills: 0 | Status: SHIPPED | OUTPATIENT
Start: 2023-08-21

## 2023-08-21 NOTE — TELEPHONE ENCOUNTER
Script originally sent for Synthroid, pharmacy sent a request for either generic or prior auth. Spoke to patient and she does not require brand name. Please send new levothyroxine to [facility], as she is leaving for vacation before mail away will get here.

## 2023-08-31 ENCOUNTER — TELEPHONE (OUTPATIENT)
Dept: BREAST CENTER | Age: 63
End: 2023-08-31

## 2023-08-31 NOTE — TELEPHONE ENCOUNTER
----- Message from Danae Sainz RN sent at 3/8/2023  2:15 PM EST -----  Patient is due for breast MRI in Sept. Please call and verify patient insurance hasnt changed and remind to have lab work drawn. Patient doesn't cycle as she had a hysterectomy. Patient last seen 3/8/23. Patient needs FU once MRI is scheduled with NP.       Electronically signed by Danae Sainz RN on 3/8/23 at 2:16 PM EST

## 2023-08-31 NOTE — TELEPHONE ENCOUNTER
Attempted to contact patient to follow up on her decision with moving forward with a breast MRI. Beeping noted and unable to leave message. Will try again another time.

## 2023-09-16 DIAGNOSIS — E03.8 HYPOTHYROIDISM DUE TO HASHIMOTO'S THYROIDITIS: ICD-10-CM

## 2023-09-16 DIAGNOSIS — E06.3 HYPOTHYROIDISM DUE TO HASHIMOTO'S THYROIDITIS: ICD-10-CM

## 2023-09-16 DIAGNOSIS — I10 ESSENTIAL HYPERTENSION: ICD-10-CM

## 2023-09-18 RX ORDER — LOSARTAN POTASSIUM AND HYDROCHLOROTHIAZIDE 12.5; 1 MG/1; MG/1
1 TABLET ORAL DAILY
Qty: 30 TABLET | Refills: 0 | OUTPATIENT
Start: 2023-09-18

## 2023-09-18 RX ORDER — LEVOTHYROXINE SODIUM 75 MCG
75 TABLET ORAL DAILY
Qty: 30 TABLET | Refills: 3 | OUTPATIENT
Start: 2023-09-18

## 2023-09-21 ENCOUNTER — OFFICE VISIT (OUTPATIENT)
Dept: FAMILY MEDICINE CLINIC | Age: 63
End: 2023-09-21
Payer: COMMERCIAL

## 2023-09-21 VITALS
TEMPERATURE: 97.1 F | DIASTOLIC BLOOD PRESSURE: 78 MMHG | SYSTOLIC BLOOD PRESSURE: 132 MMHG | HEART RATE: 65 BPM | OXYGEN SATURATION: 99 % | WEIGHT: 155.4 LBS | BODY MASS INDEX: 25.08 KG/M2

## 2023-09-21 DIAGNOSIS — Z23 FLU VACCINE NEED: ICD-10-CM

## 2023-09-21 DIAGNOSIS — E03.8 HYPOTHYROIDISM DUE TO HASHIMOTO'S THYROIDITIS: ICD-10-CM

## 2023-09-21 DIAGNOSIS — J45.20 MILD INTERMITTENT ASTHMA WITHOUT COMPLICATION: ICD-10-CM

## 2023-09-21 DIAGNOSIS — I10 ESSENTIAL HYPERTENSION: Primary | ICD-10-CM

## 2023-09-21 DIAGNOSIS — K58.1 IRRITABLE BOWEL SYNDROME WITH CONSTIPATION: ICD-10-CM

## 2023-09-21 DIAGNOSIS — M79.89 FOOT SWELLING: ICD-10-CM

## 2023-09-21 DIAGNOSIS — E06.3 HYPOTHYROIDISM DUE TO HASHIMOTO'S THYROIDITIS: ICD-10-CM

## 2023-09-21 PROCEDURE — 90471 IMMUNIZATION ADMIN: CPT | Performed by: FAMILY MEDICINE

## 2023-09-21 PROCEDURE — 99214 OFFICE O/P EST MOD 30 MIN: CPT | Performed by: FAMILY MEDICINE

## 2023-09-21 PROCEDURE — 3078F DIAST BP <80 MM HG: CPT | Performed by: FAMILY MEDICINE

## 2023-09-21 PROCEDURE — 3075F SYST BP GE 130 - 139MM HG: CPT | Performed by: FAMILY MEDICINE

## 2023-09-21 PROCEDURE — 90674 CCIIV4 VAC NO PRSV 0.5 ML IM: CPT | Performed by: FAMILY MEDICINE

## 2023-09-21 RX ORDER — LOSARTAN POTASSIUM AND HYDROCHLOROTHIAZIDE 12.5; 1 MG/1; MG/1
1 TABLET ORAL DAILY
Qty: 90 TABLET | Refills: 1 | Status: SHIPPED | OUTPATIENT
Start: 2023-09-21

## 2023-09-21 RX ORDER — LEVOTHYROXINE SODIUM 0.07 MG/1
75 TABLET ORAL DAILY
Qty: 90 TABLET | Refills: 1 | Status: SHIPPED | OUTPATIENT
Start: 2023-09-21

## 2023-09-21 RX ORDER — MONTELUKAST SODIUM 10 MG/1
10 TABLET ORAL DAILY
Qty: 30 TABLET | Refills: 1 | Status: SHIPPED | OUTPATIENT
Start: 2023-09-21

## 2023-09-21 RX ORDER — LINACLOTIDE 290 UG/1
290 CAPSULE, GELATIN COATED ORAL
Qty: 90 CAPSULE | Refills: 1 | Status: SHIPPED | OUTPATIENT
Start: 2023-09-21

## 2023-09-22 ENCOUNTER — HOSPITAL ENCOUNTER (OUTPATIENT)
Age: 63
Discharge: HOME | End: 2023-09-22
Payer: COMMERCIAL

## 2023-09-22 ENCOUNTER — HOSPITAL ENCOUNTER (OUTPATIENT)
Dept: GENERAL RADIOLOGY | Age: 63
End: 2023-09-22
Payer: COMMERCIAL

## 2023-09-22 ENCOUNTER — HOSPITAL ENCOUNTER (OUTPATIENT)
Age: 63
End: 2023-09-22
Payer: COMMERCIAL

## 2023-09-22 DIAGNOSIS — I10 ESSENTIAL HYPERTENSION: ICD-10-CM

## 2023-09-22 DIAGNOSIS — M79.89 FOOT SWELLING: ICD-10-CM

## 2023-09-22 PROCEDURE — 73630 X-RAY EXAM OF FOOT: CPT

## 2023-09-25 ENCOUNTER — TELEPHONE (OUTPATIENT)
Dept: BREAST CENTER | Age: 63
End: 2023-09-25

## 2023-09-25 NOTE — TELEPHONE ENCOUNTER
Patient returned call to move forward with breast MRI. No cycles. She will get her lab work done once she has a date for the breast MRI. We will obtain authorization first then move forward with scheduling.

## 2023-09-27 ENCOUNTER — TELEPHONE (OUTPATIENT)
Dept: BREAST CENTER | Age: 63
End: 2023-09-27

## 2023-09-27 NOTE — TELEPHONE ENCOUNTER
Authorization obtained for breast MRI  [id number] - [id number] valid until 10/26/23 per Gaby Wayne  - [id number] Fern[address]dez need for outpatient follow-up/lab results/return to ED if symptoms worsen, persist or questions arise

## 2023-09-30 ENCOUNTER — HOSPITAL ENCOUNTER (OUTPATIENT)
Age: 63
Discharge: HOME | End: 2023-09-30
Payer: COMMERCIAL

## 2023-09-30 DIAGNOSIS — E87.5 HYPERKALEMIA: ICD-10-CM

## 2023-09-30 LAB
ANION GAP SERPL CALCULATED.3IONS-SCNC: 12 MMOL/L (ref 7–16)
BUN SERPL-MCNC: 22 MG/DL (ref 6–23)
CALCIUM SERPL-MCNC: 9.9 MG/DL (ref 8.6–10.2)
CHLORIDE SERPL-SCNC: 102 MMOL/L (ref 98–107)
CO2 SERPL-SCNC: 25 MMOL/L (ref 22–29)
CREAT SERPL-MCNC: 0.8 MG/DL (ref 0.5–1)
GFR SERPL CREATININE-BSD FRML MDRD: >60 ML/MIN/1.73M2
GLUCOSE SERPL-MCNC: 88 MG/DL (ref 74–99)
POTASSIUM SERPL-SCNC: 5.1 MMOL/L (ref 3.5–5)
SODIUM SERPL-SCNC: 139 MMOL/L (ref 132–146)

## 2023-09-30 PROCEDURE — 36415 COLL VENOUS BLD VENIPUNCTURE: CPT

## 2023-09-30 PROCEDURE — 80048 BASIC METABOLIC PNL TOTAL CA: CPT

## 2023-10-02 DIAGNOSIS — E87.5 HYPERKALEMIA: Primary | ICD-10-CM

## 2023-10-12 ENCOUNTER — TELEPHONE (OUTPATIENT)
Dept: BREAST CENTER | Age: 63
End: 2023-10-12

## 2023-10-12 DIAGNOSIS — Z91.89 AT HIGH RISK FOR BREAST CANCER: ICD-10-CM

## 2023-10-12 DIAGNOSIS — R92.2 DENSE BREAST: Primary | ICD-10-CM

## 2023-10-12 DIAGNOSIS — R92.30 DENSE BREAST: Primary | ICD-10-CM

## 2023-10-12 NOTE — TELEPHONE ENCOUNTER
Touched base with patient. Discussed orders for bilateral whole breast ultrasounds. Patient declined having a breast MRI due to the estimate. I provided the patient with the order number along with diagnosis codes for her to call her insurance on coverage for the ultrasounds. I asked the patient to call us back regardless to let us know if she would like to proceed. We can certainly have her come in for an office visit for a breast exam if her ultrasounds are not covered. Patient verbalized her understanding.

## 2023-10-12 NOTE — TELEPHONE ENCOUNTER
----- Message from MARINA Sandoval CNP sent at 10/11/2023  4:16 PM EDT -----  US first then follow up please.  ----- Message -----  From: Rosi Perla RN  Sent: 10/11/2023   2:44 PM EDT  To: MARINA Sandoval CNP    Okay, thanks. would you like bilateral whole breast ultrasounds or to see her first?     Thank you,  Gladys  ----- Message -----  From: MARINA Sandoval CNP  Sent: 10/11/2023   1:04 PM EDT  To: Rosi Perla RN    Ok. Please just be sure to document that and I will discuss on her next visit. t  ----- Message -----  From: Rosi Perla RN  Sent: 10/11/2023  11:34 AM EDT  To: MARINA Sandoval CNP,     This patient is due for a breast MRI for high risk. We went to call her to schedule after obtaining authorization and she changed her mind due to the estimate. She did not want us to call and reschedule. Would you like us to offer her whole breast ultrasounds?     Thank you,  Gladys

## 2023-10-16 DIAGNOSIS — E06.3 HYPOTHYROIDISM DUE TO HASHIMOTO'S THYROIDITIS: ICD-10-CM

## 2023-10-16 DIAGNOSIS — E03.8 HYPOTHYROIDISM DUE TO HASHIMOTO'S THYROIDITIS: ICD-10-CM

## 2023-10-16 RX ORDER — LEVOTHYROXINE SODIUM 0.07 MG/1
75 TABLET ORAL DAILY
Qty: 90 TABLET | Refills: 1 | Status: SHIPPED | OUTPATIENT
Start: 2023-10-16

## 2023-10-16 RX ORDER — LEVOTHYROXINE SODIUM 0.07 MG/1
75 TABLET ORAL DAILY
Qty: 90 TABLET | Refills: 0 | Status: SHIPPED
Start: 2023-10-16 | End: 2023-10-16 | Stop reason: SDUPTHER

## 2023-10-16 NOTE — TELEPHONE ENCOUNTER
Medication Refill Request    LOV 9/21/2023  NOV 2/15/2024    Lab Results   Component Value Date    CREATININE 0.8 09/30/2023

## 2023-10-24 ENCOUNTER — TELEPHONE (OUTPATIENT)
Dept: BREAST CENTER | Age: 63
End: 2023-10-24

## 2023-10-24 NOTE — TELEPHONE ENCOUNTER
----- Message from MARINA Galindo CNP sent at 10/11/2023  4:16 PM EDT -----  US first then follow up please.  ----- Message -----  From: Daniele Ballard RN  Sent: 10/11/2023   2:44 PM EDT  To: MARINA Galindo CNP    Okay, thanks. would you like bilateral whole breast ultrasounds or to see her first?     Thank you,  Gladys  ----- Message -----  From: MARINA Galindo CNP  Sent: 10/11/2023   1:04 PM EDT  To: Daniele Ballard RN    Ok. Please just be sure to document that and I will discuss on her next visit. t  ----- Message -----  From: Daniele Ballard RN  Sent: 10/11/2023  11:34 AM EDT  To: MARINA Galindo CNP,     This patient is due for a breast MRI for high risk. We went to call her to schedule after obtaining authorization and she changed her mind due to the estimate. She did not want us to call and reschedule. Would you like us to offer her whole breast ultrasounds?     Thank you,  Gladys

## 2023-10-24 NOTE — TELEPHONE ENCOUNTER
Patient returned call. Patient extremely frustrated with not being able to have her bilateral whole breast ultrasounds until January 2024 at [facility]. Patient said she was able to check with her insurance but is not happy with the time frame as she is not available in January. I offered the opportunity to discuss her concerns with the [facility], patient declined. I also attempted to schedule her for a clinical follow up as she is due now for a clinical breast exam for high risk status. She did not wish to schedule an appointment. I attempted to explain why it is still recommended to come in. She said she would schedule a yearly appointment with the whole breast ultrasounds at that time in March 2024. I discussed that she would be due for yearly screening mammogram and that I am happy to schedule this and we can revisit the need for both tests with our nurse practitioner. Patient very angry and said, \"I'm done\" and hung up phone.

## 2023-10-24 NOTE — TELEPHONE ENCOUNTER
Attempted to follow up with patient to see if she would like to move forward with scheduling bilateral whole breast ultrasounds and/or come in for a clinical follow up with our NP. Call back number provided.

## 2024-03-16 ASSESSMENT — PATIENT HEALTH QUESTIONNAIRE - PHQ9
SUM OF ALL RESPONSES TO PHQ9 QUESTIONS 1 & 2: 0
2. FEELING DOWN, DEPRESSED OR HOPELESS: NOT AT ALL
SUM OF ALL RESPONSES TO PHQ QUESTIONS 1-9: 0
SUM OF ALL RESPONSES TO PHQ9 QUESTIONS 1 & 2: 0
SUM OF ALL RESPONSES TO PHQ QUESTIONS 1-9: 0
SUM OF ALL RESPONSES TO PHQ QUESTIONS 1-9: 0
1. LITTLE INTEREST OR PLEASURE IN DOING THINGS: NOT AT ALL
SUM OF ALL RESPONSES TO PHQ QUESTIONS 1-9: 0
2. FEELING DOWN, DEPRESSED OR HOPELESS: NOT AT ALL

## 2024-03-20 ENCOUNTER — OFFICE VISIT (OUTPATIENT)
Dept: FAMILY MEDICINE CLINIC | Age: 64
End: 2024-03-20
Payer: COMMERCIAL

## 2024-03-20 VITALS
BODY MASS INDEX: 25.71 KG/M2 | WEIGHT: 160 LBS | HEART RATE: 68 BPM | HEIGHT: 66 IN | OXYGEN SATURATION: 98 % | TEMPERATURE: 97.5 F | DIASTOLIC BLOOD PRESSURE: 74 MMHG | SYSTOLIC BLOOD PRESSURE: 132 MMHG

## 2024-03-20 DIAGNOSIS — K58.1 IRRITABLE BOWEL SYNDROME WITH CONSTIPATION: ICD-10-CM
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
## 2024-03-20 DIAGNOSIS — E03.8 HYPOTHYROIDISM DUE TO HASHIMOTO'S THYROIDITIS: ICD-10-CM

## 2024-03-20 DIAGNOSIS — E06.3 HYPOTHYROIDISM DUE TO HASHIMOTO'S THYROIDITIS: ICD-10-CM

## 2024-03-20 DIAGNOSIS — E55.9 VITAMIN D DEFICIENCY, UNSPECIFIED: ICD-10-CM

## 2024-03-20 DIAGNOSIS — I10 ESSENTIAL HYPERTENSION: Primary | ICD-10-CM

## 2024-03-20 DIAGNOSIS — Z12.11 SCREEN FOR COLON CANCER: ICD-10-CM

## 2024-03-20 DIAGNOSIS — M79.89 RIGHT LEG SWELLING: ICD-10-CM

## 2024-03-20 DIAGNOSIS — J45.20 MILD INTERMITTENT ASTHMA WITHOUT COMPLICATION: ICD-10-CM

## 2024-03-20 DIAGNOSIS — I10 ESSENTIAL HYPERTENSION: ICD-10-CM

## 2024-03-20 DIAGNOSIS — Z12.31 ENCOUNTER FOR SCREENING MAMMOGRAM FOR BREAST CANCER: ICD-10-CM

## 2024-03-20 LAB
ABSOLUTE IMMATURE GRANULOCYTE: <0.03 K/UL (ref 0–0.58)
ALBUMIN SERPL-MCNC: 4.2 G/DL (ref 3.5–5.2)
ALP BLD-CCNC: 99 U/L (ref 35–104)
ALT SERPL-CCNC: 25 U/L (ref 0–32)
ANION GAP SERPL CALCULATED.3IONS-SCNC: 17 MMOL/L (ref 7–16)
AST SERPL-CCNC: 26 U/L (ref 0–31)
BASOPHILS ABSOLUTE: 0.04 K/UL (ref 0–0.2)
BASOPHILS RELATIVE PERCENT: 1 % (ref 0–2)
BILIRUB SERPL-MCNC: 0.6 MG/DL (ref 0–1.2)
BUN BLDV-MCNC: 18 MG/DL (ref 6–23)
CALCIUM SERPL-MCNC: 9.5 MG/DL (ref 8.6–10.2)
CHLORIDE BLD-SCNC: 96 MMOL/L (ref 98–107)
CHOLESTEROL: 179 MG/DL
CO2: 23 MMOL/L (ref 22–29)
CREAT SERPL-MCNC: 0.7 MG/DL (ref 0.5–1)
D DIMER: <200 NG/ML DDU (ref 0–232)
EOSINOPHILS ABSOLUTE: 0.1 K/UL (ref 0.05–0.5)
EOSINOPHILS RELATIVE PERCENT: 1 % (ref 0–6)
GFR SERPL CREATININE-BSD FRML MDRD: >60 ML/MIN/1.73M2
GLUCOSE BLD-MCNC: 85 MG/DL (ref 74–99)
HCT VFR BLD CALC: 42.7 % (ref 34–48)
HDLC SERPL-MCNC: 72 MG/DL
HEMOGLOBIN: 14.1 G/DL (ref 11.5–15.5)
IMMATURE GRANULOCYTES: 0 % (ref 0–5)
LDL CHOLESTEROL: 75 MG/DL
LYMPHOCYTES ABSOLUTE: 1.97 K/UL (ref 1.5–4)
LYMPHOCYTES RELATIVE PERCENT: 28 % (ref 20–42)
MCH RBC QN AUTO: 30.2 PG (ref 26–35)
MCHC RBC AUTO-ENTMCNC: 33 G/DL (ref 32–34.5)
MCV RBC AUTO: 91.4 FL (ref 80–99.9)
MONOCYTES ABSOLUTE: 0.54 K/UL (ref 0.1–0.95)
MONOCYTES RELATIVE PERCENT: 8 % (ref 2–12)
NEUTROPHILS ABSOLUTE: 4.48 K/UL (ref 1.8–7.3)
NEUTROPHILS RELATIVE PERCENT: 63 % (ref 43–80)
PDW BLD-RTO: 12.2 % (ref 11.5–15)
PLATELET # BLD: 290 K/UL (ref 130–450)
PMV BLD AUTO: 11.1 FL (ref 7–12)
POTASSIUM SERPL-SCNC: 3.8 MMOL/L (ref 3.5–5)
RBC # BLD: 4.67 M/UL (ref 3.5–5.5)
SODIUM BLD-SCNC: 136 MMOL/L (ref 132–146)
TOTAL PROTEIN: 6.7 G/DL (ref 6.4–8.3)
TRIGL SERPL-MCNC: 159 MG/DL
TSH SERPL DL<=0.05 MIU/L-ACNC: 2.96 UIU/ML (ref 0.27–4.2)
VITAMIN D 25-HYDROXY: 79.7 NG/ML (ref 30–100)
VLDLC SERPL CALC-MCNC: 32 MG/DL
WBC # BLD: 7.2 K/UL (ref 4.5–11.5)

## 2024-03-20 PROCEDURE — 3075F SYST BP GE 130 - 139MM HG: CPT | Performed by: FAMILY MEDICINE

## 2024-03-20 PROCEDURE — 99214 OFFICE O/P EST MOD 30 MIN: CPT | Performed by: FAMILY MEDICINE

## 2024-03-20 PROCEDURE — 3078F DIAST BP <80 MM HG: CPT | Performed by: FAMILY MEDICINE

## 2024-03-20 RX ORDER — LOSARTAN POTASSIUM AND HYDROCHLOROTHIAZIDE 12.5; 1 MG/1; MG/1
1 TABLET ORAL DAILY
Qty: 90 TABLET | Refills: 1 | Status: SHIPPED | OUTPATIENT
Start: 2024-03-20

## 2024-03-20 RX ORDER — MONTELUKAST SODIUM 10 MG/1
10 TABLET ORAL DAILY
Qty: 30 TABLET | Refills: 1 | Status: SHIPPED | OUTPATIENT
Start: 2024-03-20

## 2024-03-20 RX ORDER — LINACLOTIDE 290 UG/1
290 CAPSULE, GELATIN COATED ORAL
Qty: 90 CAPSULE | Refills: 1 | Status: SHIPPED | OUTPATIENT
Start: 2024-03-20

## 2024-03-20 RX ORDER — LEVOTHYROXINE SODIUM 0.07 MG/1
75 TABLET ORAL DAILY
Qty: 90 TABLET | Refills: 1 | Status: SHIPPED | OUTPATIENT
Start: 2024-03-20

## 2024-03-20 SDOH — ECONOMIC STABILITY: FOOD INSECURITY: WITHIN THE PAST 12 MONTHS, THE FOOD YOU BOUGHT JUST DIDN'T LAST AND YOU DIDN'T HAVE MONEY TO GET MORE.: NEVER TRUE

## 2024-03-20 SDOH — ECONOMIC STABILITY: FOOD INSECURITY: WITHIN THE PAST 12 MONTHS, YOU WORRIED THAT YOUR FOOD WOULD RUN OUT BEFORE YOU GOT MONEY TO BUY MORE.: NEVER TRUE

## 2024-03-20 SDOH — ECONOMIC STABILITY: INCOME INSECURITY: HOW HARD IS IT FOR YOU TO PAY FOR THE VERY BASICS LIKE FOOD, HOUSING, MEDICAL CARE, AND HEATING?: NOT HARD AT ALL

## 2024-03-20 NOTE — PROGRESS NOTES
CC: Mary Alice Marsh is a 63 y.o. yo female is here for evaluation evaluation for the following chronic medical concerns: Hypertension, 4 month follow up, and Leg Pain (Right lower leg pain for 6 weeks, fell up the steps)        HPI:      Now R leg shin pain; fell and scraped shin and knee and now shin in hurting; this occured 6 weeks ago and there is no resolution; there is some swelling on the R leg    HTN: on hctz 12.5mg; cozaar 100mg; hx of hyperkalemia; last labs wnl  HLD: ASCVD 4.2%; not on medical therapy  Hypothyroidism - on synthroid 75mcg  GERD / HH: controlled off of medical therapy per previous  IBS with constipation: linzess daily; eats a lot of fiber; still has slow transit --- per previous  Mild bronchospasm:  controlled on singular 10mg daily  Last c-scope 2014; Dr. Antony; 10 year interval; strong FH of colon cancer and other cancers; prefers to wait for repeat c-scope for 10 years and due at this time; requesting new referral  Strong FH of cancer / breast ca  / ovarian cancer; did have f/u with [facility]; possible MRI breast for screening, due for f/u; plan to call to schedule  Preventive: scattered densities; TC risk 16%%; due for screening mammo  Follows with gyn Dr. Hogan  Hx of partial hyster      Vitals:   /74 (Site: Right Upper Arm, Position: Sitting)   Pulse 68   Temp 97.5 °F (36.4 °C)   Ht 1.676 m (5' 6\")   Wt 72.6 kg (160 lb)   SpO2 98%   BMI 25.82 kg/m²   Wt Readings from Last 3 Encounters:   03/20/24 72.6 kg (160 lb)   09/21/23 70.5 kg (155 lb 6.4 oz)   06/20/23 69.4 kg (153 lb)       PE:  Constitutional - alert, well appearing, and in no distress  Eyes - extraocular eye movements intact, left eye normal, right eye normal, no conjunctivitis noted  Neck - symmetric, no obvious masses noted  Respiratory- clear to auscultation, no wheezes, rales or rhonchi, symmetric air entry; no increased work of breathing  Cardiovascular - normal rate, regular rhythm, normal S1, S2, no

## 2024-03-23 DIAGNOSIS — K58.1 IRRITABLE BOWEL SYNDROME WITH CONSTIPATION: ICD-10-CM

## 2024-03-23 DIAGNOSIS — J45.20 MILD INTERMITTENT ASTHMA WITHOUT COMPLICATION: ICD-10-CM

## 2024-03-23 DIAGNOSIS — I10 ESSENTIAL HYPERTENSION: ICD-10-CM

## 2024-03-23 DIAGNOSIS — E06.3 HYPOTHYROIDISM DUE TO HASHIMOTO'S THYROIDITIS: ICD-10-CM

## 2024-03-23 DIAGNOSIS — E03.8 HYPOTHYROIDISM DUE TO HASHIMOTO'S THYROIDITIS: ICD-10-CM

## 2024-03-25 DIAGNOSIS — I10 ESSENTIAL HYPERTENSION: ICD-10-CM

## 2024-03-25 DIAGNOSIS — E03.8 HYPOTHYROIDISM DUE TO HASHIMOTO'S THYROIDITIS: ICD-10-CM

## 2024-03-25 DIAGNOSIS — J45.20 MILD INTERMITTENT ASTHMA WITHOUT COMPLICATION: ICD-10-CM

## 2024-03-25 DIAGNOSIS — E06.3 HYPOTHYROIDISM DUE TO HASHIMOTO'S THYROIDITIS: ICD-10-CM

## 2024-03-25 DIAGNOSIS — K58.1 IRRITABLE BOWEL SYNDROME WITH CONSTIPATION: ICD-10-CM

## 2024-03-25 RX ORDER — LEVOTHYROXINE SODIUM 0.07 MG/1
75 TABLET ORAL DAILY
Qty: 90 TABLET | Refills: 0 | Status: SHIPPED | OUTPATIENT
Start: 2024-03-25

## 2024-03-25 RX ORDER — LOSARTAN POTASSIUM AND HYDROCHLOROTHIAZIDE 12.5; 1 MG/1; MG/1
1 TABLET ORAL DAILY
Qty: 90 TABLET | Refills: 0 | Status: SHIPPED | OUTPATIENT
Start: 2024-03-25

## 2024-03-25 RX ORDER — LOSARTAN POTASSIUM AND HYDROCHLOROTHIAZIDE 12.5; 1 MG/1; MG/1
1 TABLET ORAL DAILY
Qty: 90 TABLET | Refills: 1 | OUTPATIENT
Start: 2024-03-25

## 2024-03-25 RX ORDER — LINACLOTIDE 290 UG/1
290 CAPSULE, GELATIN COATED ORAL
Qty: 90 CAPSULE | Refills: 0 | Status: SHIPPED | OUTPATIENT
Start: 2024-03-25

## 2024-03-25 RX ORDER — MONTELUKAST SODIUM 10 MG/1
10 TABLET ORAL DAILY
Qty: 30 TABLET | Refills: 0 | Status: SHIPPED | OUTPATIENT
Start: 2024-03-25

## 2024-03-25 RX ORDER — LEVOTHYROXINE SODIUM 75 MCG
75 TABLET ORAL DAILY
Qty: 90 TABLET | Refills: 1 | OUTPATIENT
Start: 2024-03-25

## 2024-03-25 RX ORDER — MONTELUKAST SODIUM 10 MG/1
10 TABLET ORAL DAILY
Qty: 30 TABLET | Refills: 0 | Status: SHIPPED
Start: 2024-03-25 | End: 2024-03-25 | Stop reason: SDUPTHER

## 2024-03-25 RX ORDER — LOSARTAN POTASSIUM AND HYDROCHLOROTHIAZIDE 12.5; 1 MG/1; MG/1
1 TABLET ORAL DAILY
Qty: 90 TABLET | Refills: 0 | Status: SHIPPED
Start: 2024-03-25 | End: 2024-03-25 | Stop reason: SDUPTHER

## 2024-03-25 RX ORDER — LEVOTHYROXINE SODIUM 0.07 MG/1
75 TABLET ORAL DAILY
Qty: 90 TABLET | Refills: 0 | Status: SHIPPED
Start: 2024-03-25 | End: 2024-03-25 | Stop reason: SDUPTHER

## 2024-03-25 RX ORDER — LINACLOTIDE 290 UG/1
290 CAPSULE, GELATIN COATED ORAL
Qty: 90 CAPSULE | Refills: 1 | OUTPATIENT
Start: 2024-03-25

## 2024-03-25 RX ORDER — LINACLOTIDE 290 UG/1
290 CAPSULE, GELATIN COATED ORAL
Qty: 90 CAPSULE | Refills: 0 | Status: SHIPPED
Start: 2024-03-25 | End: 2024-03-25 | Stop reason: SDUPTHER

## 2024-03-25 RX ORDER — MONTELUKAST SODIUM 10 MG/1
10 TABLET ORAL DAILY
Qty: 30 TABLET | Refills: 1 | OUTPATIENT
Start: 2024-03-25

## 2024-03-25 RX ORDER — LEVOTHYROXINE SODIUM 0.07 MG/1
75 TABLET ORAL DAILY
Qty: 90 TABLET | Refills: 1 | OUTPATIENT
Start: 2024-03-25

## 2024-03-25 NOTE — TELEPHONE ENCOUNTER
Medication Refill Request    LOV 3/20/2024  NOV 8/21/2024    Lab Results   Component Value Date    CREATININE 0.7 03/20/2024

## 2024-03-25 NOTE — TELEPHONE ENCOUNTER
Pt called and needs her pharmacy updated.  All her scripts were sent to the wrong [facility] they need sent to the [facility] pharmacy in [place]. Pt is also requesting that her losartan be sent to [facility] pharmacy in [place] due to she is almost out of medications please advise

## 2024-04-05 NOTE — PROGRESS NOTES
Practitioner  Department of Breast Surgery  [facility]/  [facility] in collaboration with Dr. Mariaa Mendes/Dr. Kathia Bautista/Dr. Sukhwinder GRAY-NAVDEEP

## 2024-04-18 ENCOUNTER — OFFICE VISIT (OUTPATIENT)
Dept: BREAST CENTER | Age: 64
End: 2024-04-18
Payer: COMMERCIAL

## 2024-04-18 ENCOUNTER — HOSPITAL ENCOUNTER (OUTPATIENT)
Dept: GENERAL RADIOLOGY | Age: 64
Discharge: HOME | End: 2024-04-20
Payer: COMMERCIAL

## 2024-04-18 VITALS — HEIGHT: 67 IN | BODY MASS INDEX: 24.48 KG/M2 | WEIGHT: 156 LBS

## 2024-04-18 VITALS
DIASTOLIC BLOOD PRESSURE: 71 MMHG | SYSTOLIC BLOOD PRESSURE: 135 MMHG | HEART RATE: 72 BPM | OXYGEN SATURATION: 99 % | WEIGHT: 156 LBS | TEMPERATURE: 98.2 F | BODY MASS INDEX: 24.48 KG/M2 | RESPIRATION RATE: 14 BRPM | HEIGHT: 67 IN

## 2024-04-18 DIAGNOSIS — Z12.31 ENCOUNTER FOR SCREENING MAMMOGRAM FOR BREAST CANCER: ICD-10-CM

## 2024-04-18 DIAGNOSIS — Z91.89 AT HIGH RISK FOR BREAST CANCER: Primary | ICD-10-CM

## 2024-04-18 PROCEDURE — 99213 OFFICE O/P EST LOW 20 MIN: CPT | Performed by: NURSE PRACTITIONER

## 2024-04-18 PROCEDURE — 77067 SCR MAMMO BI INCL CAD: CPT

## 2024-04-18 PROCEDURE — 3078F DIAST BP <80 MM HG: CPT | Performed by: NURSE PRACTITIONER

## 2024-04-18 PROCEDURE — 3075F SYST BP GE 130 - 139MM HG: CPT | Performed by: NURSE PRACTITIONER

## 2024-04-19 DIAGNOSIS — J45.20 MILD INTERMITTENT ASTHMA WITHOUT COMPLICATION: ICD-10-CM

## 2024-04-19 DIAGNOSIS — E06.3 HYPOTHYROIDISM DUE TO HASHIMOTO'S THYROIDITIS: ICD-10-CM

## 2024-04-19 DIAGNOSIS — I10 ESSENTIAL HYPERTENSION: ICD-10-CM

## 2024-04-19 DIAGNOSIS — K58.1 IRRITABLE BOWEL SYNDROME WITH CONSTIPATION: ICD-10-CM

## 2024-04-19 DIAGNOSIS — E03.8 HYPOTHYROIDISM DUE TO HASHIMOTO'S THYROIDITIS: ICD-10-CM

## 2024-04-19 RX ORDER — MONTELUKAST SODIUM 10 MG/1
10 TABLET ORAL DAILY
Qty: 90 TABLET | Refills: 0 | Status: SHIPPED | OUTPATIENT
Start: 2024-04-19

## 2024-04-19 RX ORDER — LEVOTHYROXINE SODIUM 0.07 MG/1
75 TABLET ORAL DAILY
Qty: 90 TABLET | Refills: 0 | Status: SHIPPED | OUTPATIENT
Start: 2024-04-19

## 2024-04-19 RX ORDER — LINACLOTIDE 290 UG/1
290 CAPSULE, GELATIN COATED ORAL
Qty: 90 CAPSULE | Refills: 0 | Status: SHIPPED | OUTPATIENT
Start: 2024-04-19

## 2024-04-19 RX ORDER — LOSARTAN POTASSIUM AND HYDROCHLOROTHIAZIDE 12.5; 1 MG/1; MG/1
1 TABLET ORAL DAILY
Qty: 90 TABLET | Refills: 0 | Status: SHIPPED | OUTPATIENT
Start: 2024-04-19

## 2024-04-19 NOTE — TELEPHONE ENCOUNTER
Patient requesting her prescriptions be sent to [facility]    Patient Medication Refill Request    LOV 3/20/2024  NOV 8/21/2024    Lab Results   Component Value Date    CREATININE 0.7 03/20/2024

## 2024-04-20 DIAGNOSIS — I10 ESSENTIAL HYPERTENSION: ICD-10-CM

## 2024-04-22 RX ORDER — LOSARTAN POTASSIUM AND HYDROCHLOROTHIAZIDE 12.5; 1 MG/1; MG/1
TABLET ORAL
Qty: 90 TABLET | Refills: 0 | OUTPATIENT
Start: 2024-04-22

## 2024-05-09 ENCOUNTER — OFFICE VISIT (OUTPATIENT)
Dept: SURGERY | Age: 64
End: 2024-05-09
Payer: COMMERCIAL

## 2024-05-09 VITALS
WEIGHT: 157 LBS | TEMPERATURE: 97.3 F | BODY MASS INDEX: 24.64 KG/M2 | OXYGEN SATURATION: 99 % | HEIGHT: 67 IN | DIASTOLIC BLOOD PRESSURE: 73 MMHG | HEART RATE: 77 BPM | SYSTOLIC BLOOD PRESSURE: 150 MMHG

## 2024-05-09 DIAGNOSIS — Z79.899 MEDICATION MANAGEMENT: ICD-10-CM

## 2024-05-09 DIAGNOSIS — Z80.0 FAMILY HISTORY OF COLON CANCER: Primary | ICD-10-CM

## 2024-05-09 PROCEDURE — 99203 OFFICE O/P NEW LOW 30 MIN: CPT | Performed by: SURGERY

## 2024-05-09 PROCEDURE — 3078F DIAST BP <80 MM HG: CPT | Performed by: SURGERY

## 2024-05-09 PROCEDURE — 3077F SYST BP >= 140 MM HG: CPT | Performed by: SURGERY

## 2024-05-09 NOTE — PROGRESS NOTES
[facility] Note    Assessment/Plan:      Diagnosis Orders   1. Family history of colon cancer      Will plan for colonoscopy.      2. Medication management      Perioperative use of ARB discussed to avoid anesthesia complications            Return for Colonoscopy.      Chief Complaint   Patient presents with    New Patient     Colonoscopy screening        PCP: Dashawn Webster MD    HPI: Mary Alice Marsh is a 63 y.o. female who presents in consultation for colonoscopy.  Her last 1 was about 10 years ago.  She says she is due in October.  She takes Linzess for chronic constipation.  She has no other issues.  There is no blood in the stool.  There is no abdominal pain or unintentional weight loss.  There are no bowel caliber changes.  Her niece has a history of inflammatory bowel disease.  There are 3 uncles with a history of colon cancer as well as her mother who also had breast cancer.  She is on losartan.      Past Medical History:   Diagnosis Date    Hypertension     Hypothyroidism        Past Surgical History:   Procedure Laterality Date     SECTION      COLONOSCOPY      ENDOMETRIAL ABLATION      HYSTERECTOMY (CERVIX STATUS UNKNOWN)      PARTIAL HYSTERECTOMY (CERVIX NOT REMOVED)      TUBAL LIGATION         Prior to Admission medications    Medication Sig Start Date End Date Taking? Authorizing Provider   levothyroxine (SYNTHROID) 75 MCG tablet Take 1 tablet by mouth daily 24  Yes Africa Verma PA-C   linaclotide (LINZESS) 290 MCG CAPS capsule Take 1 capsule by mouth every morning (before breakfast) 24  Yes Africa Verma PA-C   losartan-hydroCHLOROthiazide (HYZAAR) 100-12.5 MG per tablet Take 1 tablet by mouth daily 24  Yes Africa Verma PA-C   montelukast (SINGULAIR) 10 MG tablet Take 1 tablet by mouth daily 24  Yes Africa Verma PA-C   Flaxseed, Linseed, (FLAXSEED OIL PO) Take by mouth   Yes Provider, MD Riley   Calcium Carbonate (CALTRATE 600 PO) Take

## 2024-05-23 ENCOUNTER — PREP FOR PROCEDURE (OUTPATIENT)
Dept: SURGERY | Age: 64
End: 2024-05-23

## 2024-05-23 DIAGNOSIS — Z80.0 FH: COLON CANCER: ICD-10-CM

## 2024-05-30 DIAGNOSIS — J45.20 MILD INTERMITTENT ASTHMA WITHOUT COMPLICATION: ICD-10-CM

## 2024-05-30 RX ORDER — MONTELUKAST SODIUM 10 MG/1
10 TABLET ORAL DAILY
Qty: 30 TABLET | Refills: 0 | OUTPATIENT
Start: 2024-05-30

## 2024-08-02 ENCOUNTER — TELEPHONE (OUTPATIENT)
Dept: SURGERY | Age: 64
End: 2024-08-02

## 2024-08-02 NOTE — TELEPHONE ENCOUNTER
Prior Authorization Form:      DEMOGRAPHICS:                     Patient Name:  Ravin Marsh  Patient :  1960            Insurance:  Payor: BCBS / Plan: [place] PPO / Product Type: *No Product type* /   Insurance ID Number:    Payer/Plan Subscr  Sex Relation Sub. Ins. ID Effective Group Num   1. BCBS - BCBS -* RAVIN MARSH 1960 Female Self [id number]                                   [id number]         DIAGNOSIS & PROCEDURE:                       Procedure/Operation: colon           CPT Code: 93062    Diagnosis:  family hx colon CA    ICD10 Code: z80.0    Location:  [facility]    Surgeon:  vita    SCHEDULING INFORMATION:                          Date: 24    Time: 800              Anesthesia:  MAC/TIVA                                                       Status:  Outpatient        Special Comments:         Electronically signed by Belkis Britton MA on 2024 at 9:54 AM

## 2024-08-21 ENCOUNTER — OFFICE VISIT (OUTPATIENT)
Dept: FAMILY MEDICINE CLINIC | Age: 64
End: 2024-08-21
Payer: COMMERCIAL

## 2024-08-21 VITALS
BODY MASS INDEX: 24.67 KG/M2 | HEIGHT: 67 IN | WEIGHT: 157.2 LBS | SYSTOLIC BLOOD PRESSURE: 138 MMHG | OXYGEN SATURATION: 97 % | DIASTOLIC BLOOD PRESSURE: 76 MMHG | TEMPERATURE: 96.9 F | HEART RATE: 61 BPM

## 2024-08-21 DIAGNOSIS — M85.80 OSTEOPENIA, UNSPECIFIED LOCATION: ICD-10-CM

## 2024-08-21 DIAGNOSIS — E78.5 HYPERLIPIDEMIA, UNSPECIFIED HYPERLIPIDEMIA TYPE: ICD-10-CM

## 2024-08-21 DIAGNOSIS — E03.8 HYPOTHYROIDISM DUE TO HASHIMOTO'S THYROIDITIS: ICD-10-CM

## 2024-08-21 DIAGNOSIS — K58.1 IRRITABLE BOWEL SYNDROME WITH CONSTIPATION: ICD-10-CM

## 2024-08-21 DIAGNOSIS — E06.3 HYPOTHYROIDISM DUE TO HASHIMOTO'S THYROIDITIS: ICD-10-CM

## 2024-08-21 DIAGNOSIS — I10 ESSENTIAL HYPERTENSION: Primary | ICD-10-CM

## 2024-08-21 DIAGNOSIS — K21.9 GASTROESOPHAGEAL REFLUX DISEASE, UNSPECIFIED WHETHER ESOPHAGITIS PRESENT: ICD-10-CM

## 2024-08-21 DIAGNOSIS — J45.20 MILD INTERMITTENT ASTHMA WITHOUT COMPLICATION: ICD-10-CM

## 2024-08-21 PROCEDURE — 99214 OFFICE O/P EST MOD 30 MIN: CPT | Performed by: FAMILY MEDICINE

## 2024-08-21 PROCEDURE — 3075F SYST BP GE 130 - 139MM HG: CPT | Performed by: FAMILY MEDICINE

## 2024-08-21 PROCEDURE — 3078F DIAST BP <80 MM HG: CPT | Performed by: FAMILY MEDICINE

## 2024-08-21 RX ORDER — LEVOTHYROXINE SODIUM 0.07 MG/1
75 TABLET ORAL DAILY
Qty: 90 TABLET | Refills: 1 | Status: SHIPPED | OUTPATIENT
Start: 2024-08-21

## 2024-08-21 RX ORDER — MONTELUKAST SODIUM 10 MG/1
10 TABLET ORAL DAILY
Qty: 90 TABLET | Refills: 1 | Status: SHIPPED | OUTPATIENT
Start: 2024-08-21

## 2024-08-21 RX ORDER — LINACLOTIDE 290 UG/1
290 CAPSULE, GELATIN COATED ORAL
Qty: 90 CAPSULE | Refills: 1 | Status: SHIPPED | OUTPATIENT
Start: 2024-08-21

## 2024-08-21 RX ORDER — LOSARTAN POTASSIUM AND HYDROCHLOROTHIAZIDE 12.5; 1 MG/1; MG/1
1 TABLET ORAL DAILY
Qty: 90 TABLET | Refills: 1 | Status: SHIPPED | OUTPATIENT
Start: 2024-08-21

## 2024-08-21 NOTE — PROGRESS NOTES
CC: Mary Alice Marsh is a 63 y.o. yo female is here for evaluation evaluation for the following chronic medical concerns: Hypertension, Hyperlipidemia, Hypothyroidism, and Medication Refill        HPI:    HTN: on hctz 12.5mg; cozaar 100mg; hx of hyperkalemia; last labs wnl - same  HLD: ASCVD 4.2%; not on medical therapy  Hypothyroidism - on synthroid 75mcg  Osteopenia: on ca/d  GERD / HH: controlled off of medical therapy per previous  IBS with constipation: linzess daily; eats a lot of fiber; still has slow transit --- same  Mild bronchospasm:  controlled on singular 10mg daily  Last c-scope 2014; Dr. Antony; 10 year interval; strong FH of colon cancer and other cancers; c-scope scheduled November **  Strong FH of cancer / breast ca  / ovarian cancer; did have f/u with [facility]; possible MRI breast for screening, scheduled for f/u  Preventive: scattered densities; TC risk 16%%; UTD with mammo at this time  Has not followed with gyn Dr. Hogan for some time; not sure of last pap  Hx of partial hyster      Vitals:   /76 (Site: Left Upper Arm, Position: Sitting)   Pulse 61   Temp 96.9 °F (36.1 °C)   Ht 1.702 m (5' 7\")   Wt 71.3 kg (157 lb 3.2 oz)   SpO2 97%   BMI 24.62 kg/m²   Wt Readings from Last 3 Encounters:   08/21/24 71.3 kg (157 lb 3.2 oz)   05/09/24 71.2 kg (157 lb)   04/18/24 70.8 kg (156 lb)       PE:  Constitutional - alert, well appearing, and in no distress  Eyes - extraocular eye movements intact, left eye normal, right eye normal, no conjunctivitis noted  Neck - symmetric, no obvious masses noted  Respiratory- clear to auscultation, no wheezes, rales or rhonchi, symmetric air entry; no increased work of breathing  Cardiovascular - normal rate, regular rhythm, normal S1, S2, no murmurs, rubs, clicks or gallops  Extremities -  no edema  Abdomen - soft, nontender, nondistended  Skin - normal coloration and turgor, no rashes, no suspicious skin lesions noted  Neurological - no obvious CN

## 2024-10-04 NOTE — PROGRESS NOTES
Subjective IBS risk score 25%   This note was copied forward from the last encounter.  Essential components for this patient record were reviewed and verified on this visit including:  recent hospitalizations, recent imaging, PMH, PSH, FH, SOC HX, Allergies, and Medications were reviewed and updated as appropriate.  In addition, the assessment and plan were copied from prior office note and updated accordingly.     HPI    PCP: Dashawn Webster MD, Gynecologist: Dr. Truong.    Mary Alice is a beth 63 y.o. woman with a PMH of hypothyroidism, hypertension, hyperlipidemia, asthma, irritable bowel, and GERD.  Presents today for follow-up of her predisposition to breast cancer based on gender and strong family history.  She has no personal history of cancer.  Menarche age 14.  .  First live birth age 24; she did not breast feed.  Non-smoker.      Family history of cancer:  Mother with breast cancer age 55; Uterine cancer age 66  Sister with Breast cancer age 60; Ovarian cancer age 57  Maternal aunt with breast cancer   Maternal aunt wih breast cancer   Maternal uncle with pancreatic cancer   Maternal uncle with colon cancer   Maternal uncle with colon cancer  Ashkenazi [employer] Ancestry: No.  History of prior thoracic radiation therapy:  No    Breast Imaging: [facility] Matias IBS: 25% Grade B breast density.  Declined to enhance imaging in the absence of symptoms opting instead for clinical exam every 6 months.  Imaging annually..  2017 bilateral screening mammogram: Negative, BI-RADS 1.  2022 bilateral screening mammogram: Negative, BI-RADS 1.  2024 bilateral screening mammogram: Negative, BI-RADS 1.    Breast Biopsies:  Denies    Occupation: .  Enjoys spending time with family    Review of Systems   Constitutional:  Negative for activity change, appetite change, fatigue, fever and unexpected weight change.        Mary Alice is doing well.  Enjoys

## 2024-10-17 ENCOUNTER — OFFICE VISIT (OUTPATIENT)
Dept: BREAST CENTER | Age: 64
End: 2024-10-17
Payer: COMMERCIAL

## 2024-10-17 VITALS
DIASTOLIC BLOOD PRESSURE: 68 MMHG | RESPIRATION RATE: 20 BRPM | HEIGHT: 66 IN | HEART RATE: 82 BPM | WEIGHT: 155 LBS | BODY MASS INDEX: 24.91 KG/M2 | TEMPERATURE: 98 F | SYSTOLIC BLOOD PRESSURE: 128 MMHG | OXYGEN SATURATION: 96 %

## 2024-10-17 DIAGNOSIS — Z12.31 VISIT FOR SCREENING MAMMOGRAM: Primary | ICD-10-CM

## 2024-10-17 PROCEDURE — 3074F SYST BP LT 130 MM HG: CPT | Performed by: NURSE PRACTITIONER

## 2024-10-17 PROCEDURE — 99213 OFFICE O/P EST LOW 20 MIN: CPT | Performed by: NURSE PRACTITIONER

## 2024-10-17 PROCEDURE — 3078F DIAST BP <80 MM HG: CPT | Performed by: NURSE PRACTITIONER

## 2024-11-01 ENCOUNTER — TELEPHONE (OUTPATIENT)
Dept: SURGERY | Age: 64
End: 2024-11-01

## 2024-11-01 NOTE — TELEPHONE ENCOUNTER
Patient had called and asked how her colonoscopy was coded because she is getting a high quote for this.  MA checked her chart and informed patient that, since she has family history, her colonoscopy was coded as high screening.  Patient stated that she wasn't paying if that is the case.  MA checked with Dr Villalobos who stated that, her mother had colon cancer and her records need to reflect that, which they do now.  MA tried to explain that to the patient and she got very irate stating that. \"I shouldn't of stated my family history\", \"I have history of breast cancer and my mammograms are paid\", \"It is not fair that I do not get a screening colonoscopy\".  MA tried explaining that is how her insurance looks at coding but patient was very angry.  MA asked if patient wanted to cancel and she stated yes and started yelling that the MA did not help or listen to her and hung up.  Electronically signed by Gretchen Foster MA on 11/1/2024 at 3:55 PM

## 2024-11-20 ENCOUNTER — OFFICE VISIT (OUTPATIENT)
Dept: FAMILY MEDICINE CLINIC | Age: 64
End: 2024-11-20

## 2024-11-20 VITALS
HEART RATE: 69 BPM | DIASTOLIC BLOOD PRESSURE: 76 MMHG | WEIGHT: 154 LBS | TEMPERATURE: 98.7 F | RESPIRATION RATE: 16 BRPM | OXYGEN SATURATION: 99 % | HEIGHT: 66 IN | SYSTOLIC BLOOD PRESSURE: 136 MMHG | BODY MASS INDEX: 24.75 KG/M2

## 2024-11-20 DIAGNOSIS — J45.20 MILD INTERMITTENT ASTHMA WITHOUT COMPLICATION: ICD-10-CM

## 2024-11-20 DIAGNOSIS — L50.9 URTICARIA: Primary | ICD-10-CM

## 2024-11-20 DIAGNOSIS — K58.1 IRRITABLE BOWEL SYNDROME WITH CONSTIPATION: ICD-10-CM

## 2024-11-20 DIAGNOSIS — E06.3 HYPOTHYROIDISM DUE TO HASHIMOTO'S THYROIDITIS: ICD-10-CM

## 2024-11-20 DIAGNOSIS — G57.02 PIRIFORMIS SYNDROME OF LEFT SIDE: ICD-10-CM

## 2024-11-20 DIAGNOSIS — I10 ESSENTIAL HYPERTENSION: ICD-10-CM

## 2024-11-20 RX ORDER — LOSARTAN POTASSIUM AND HYDROCHLOROTHIAZIDE 12.5; 1 MG/1; MG/1
1 TABLET ORAL DAILY
Qty: 90 TABLET | Refills: 1 | Status: SHIPPED | OUTPATIENT
Start: 2024-11-20

## 2024-11-20 RX ORDER — LEVOTHYROXINE SODIUM 75 UG/1
75 TABLET ORAL DAILY
Qty: 90 TABLET | Refills: 1 | Status: SHIPPED | OUTPATIENT
Start: 2024-11-20

## 2024-11-20 RX ORDER — LINACLOTIDE 290 UG/1
290 CAPSULE, GELATIN COATED ORAL
Qty: 90 CAPSULE | Refills: 1 | Status: SHIPPED | OUTPATIENT
Start: 2024-11-20

## 2024-11-20 RX ORDER — MONTELUKAST SODIUM 10 MG/1
10 TABLET ORAL DAILY
Qty: 90 TABLET | Refills: 1 | Status: SHIPPED | OUTPATIENT
Start: 2024-11-20

## 2024-11-20 RX ORDER — METHYLPREDNISOLONE 4 MG/1
TABLET ORAL
COMMUNITY
Start: 2024-11-17

## 2024-11-20 NOTE — PROGRESS NOTES
CC: Mary Alice Marsh is a 63 y.o. yo female is here for evaluation evaluation for the following chronic medical concerns: Urticaria (/) and Rash        HPI:      Saturday night started to have hives; By Sunday morning hives all over body; raised rash throughout the trunk and her eyelids and face were very swollen as well; she was seen at urgent care; given medrol dose pack and she has 2 more days left on this; she is concerned that this was a reaction to her new puppy who was recently diagnosed with giardia; the puppy completed 1st round of treatment which did not work and now has to be on 2nd round of treatment    She has a dry cough for 3 weeks; Coughing randomly throughout the day; dry cough; cannot stop from coughing; 3-4 coughs at a time; can not not cough; ongoing for 3 weeks; no fever chills sweats no recent URI; Since starting the predinosone cough is about he same; she has hx of bronchospasm; symptoms occurs about 10x per day    L hip is killing her; Hip back region and L buttock hurts; no traumatic inciting injury; no radiating symptoms; When cough can feel it radiating down the buttock      Vitals:   /76 (Site: Left Upper Arm, Position: Sitting)   Pulse 69   Temp 98.7 °F (37.1 °C)   Resp 16   Ht 1.676 m (5' 6\")   Wt 69.9 kg (154 lb)   SpO2 99%   BMI 24.86 kg/m²   Wt Readings from Last 3 Encounters:   11/20/24 69.9 kg (154 lb)   10/17/24 70.3 kg (155 lb)   08/21/24 71.3 kg (157 lb 3.2 oz)       PE:  Constitutional - alert, well appearing, and in no distress  Eyes - extraocular eye movements intact, left eye normal, right eye normal, no conjunctivitis noted  Neck - symmetric, no obvious masses noted  Respiratory- clear to auscultation, no wheezes, rales or rhonchi, symmetric air entry; no increased work of breathing  Cardiovascular - normal rate, regular rhythm, normal S1, S2, no murmurs, rubs, clicks or gallops  Extremities -  no edema  Msk: no spinal ttp; no paraspinal ttp; normal hip

## 2024-12-13 ENCOUNTER — OFFICE VISIT (OUTPATIENT)
Dept: FAMILY MEDICINE CLINIC | Age: 64
End: 2024-12-13

## 2024-12-13 VITALS
WEIGHT: 154.6 LBS | TEMPERATURE: 98.1 F | SYSTOLIC BLOOD PRESSURE: 130 MMHG | BODY MASS INDEX: 24.27 KG/M2 | RESPIRATION RATE: 16 BRPM | DIASTOLIC BLOOD PRESSURE: 82 MMHG | OXYGEN SATURATION: 99 % | HEIGHT: 67 IN | HEART RATE: 80 BPM

## 2024-12-13 DIAGNOSIS — J45.20 MILD INTERMITTENT ASTHMA WITHOUT COMPLICATION: ICD-10-CM

## 2024-12-13 DIAGNOSIS — K58.1 IRRITABLE BOWEL SYNDROME WITH CONSTIPATION: ICD-10-CM

## 2024-12-13 DIAGNOSIS — I10 ESSENTIAL HYPERTENSION: ICD-10-CM

## 2024-12-13 DIAGNOSIS — E06.3 HYPOTHYROIDISM DUE TO HASHIMOTO'S THYROIDITIS: ICD-10-CM

## 2024-12-13 DIAGNOSIS — E55.9 VITAMIN D DEFICIENCY, UNSPECIFIED: ICD-10-CM

## 2024-12-13 DIAGNOSIS — I10 ESSENTIAL HYPERTENSION: Primary | ICD-10-CM

## 2024-12-13 LAB
ANION GAP SERPL CALCULATED.3IONS-SCNC: 14 MMOL/L (ref 7–16)
BUN BLDV-MCNC: 20 MG/DL (ref 6–23)
CALCIUM SERPL-MCNC: 9.8 MG/DL (ref 8.6–10.2)
CHLORIDE BLD-SCNC: 99 MMOL/L (ref 98–107)
CO2: 26 MMOL/L (ref 22–29)
CREAT SERPL-MCNC: 0.7 MG/DL (ref 0.5–1)
GFR, ESTIMATED: >90 ML/MIN/1.73M2
GLUCOSE BLD-MCNC: 87 MG/DL (ref 74–99)
POTASSIUM SERPL-SCNC: 3.5 MMOL/L (ref 3.5–5)
SODIUM BLD-SCNC: 139 MMOL/L (ref 132–146)
T4 FREE: 1.5 NG/DL (ref 0.9–1.7)
TSH SERPL DL<=0.05 MIU/L-ACNC: 1.66 UIU/ML (ref 0.27–4.2)

## 2024-12-13 RX ORDER — LOSARTAN POTASSIUM AND HYDROCHLOROTHIAZIDE 12.5; 1 MG/1; MG/1
1 TABLET ORAL DAILY
Qty: 90 TABLET | Refills: 1 | Status: SHIPPED | OUTPATIENT
Start: 2024-12-13

## 2024-12-13 RX ORDER — LINACLOTIDE 290 UG/1
290 CAPSULE, GELATIN COATED ORAL
Qty: 90 CAPSULE | Refills: 1 | Status: SHIPPED | OUTPATIENT
Start: 2024-12-13

## 2024-12-13 RX ORDER — MONTELUKAST SODIUM 10 MG/1
10 TABLET ORAL DAILY
Qty: 90 TABLET | Refills: 1 | Status: SHIPPED | OUTPATIENT
Start: 2024-12-13

## 2024-12-13 RX ORDER — LEVOTHYROXINE SODIUM 75 UG/1
75 TABLET ORAL DAILY
Qty: 90 TABLET | Refills: 1 | Status: SHIPPED | OUTPATIENT
Start: 2024-12-13

## 2024-12-13 NOTE — PROGRESS NOTES
suspicious skin lesions noted  Neurological - no obvious CN deficits or focal neurological deficits  Psychiatric - alert, oriented; normal mood, behavior, speech, dress    A / P:     Diagnosis Orders   1. Essential hypertension  losartan-hydroCHLOROthiazide (HYZAAR) 100-12.5 MG per tablet    CBC with Auto Differential    Comprehensive Metabolic Panel    Lipid Panel    TSH      2. Hypothyroidism due to Hashimoto's thyroiditis  levothyroxine (SYNTHROID) 75 MCG tablet    TSH      3. Irritable bowel syndrome with constipation  linaclotide (LINZESS) 290 MCG CAPS capsule      4. Mild intermittent asthma without complication  montelukast (SINGULAIR) 10 MG tablet      5. Vitamin D deficiency, unspecified  Vitamin D 25 Hydroxy          Labs as ordered  Continue synthroid 75mcg   Continue losartan 100mg + hctz 12.5mg for now  Continue linzess; can add miralax as well per prior  F/u with planned c-scope 3/2025 with Dr. Woodruff  Continue f/u with [facility]; recs for MRI per [facility]      RTO: Return in about 5 months (around 5/13/2025) for chronic disease / routine f/u.      An electronic signature was used to authenticate this note.  ---- Dashawn Webster MD on 12/13/2024 at 1:28 PM

## 2025-02-16 ENCOUNTER — APPOINTMENT (OUTPATIENT)
Dept: GENERAL RADIOLOGY | Age: 65
End: 2025-02-16
Payer: COMMERCIAL

## 2025-02-16 ENCOUNTER — HOSPITAL ENCOUNTER (EMERGENCY)
Age: 65
Discharge: HOME | End: 2025-02-16
Payer: COMMERCIAL

## 2025-02-16 VITALS
DIASTOLIC BLOOD PRESSURE: 80 MMHG | BODY MASS INDEX: 24.28 KG/M2 | TEMPERATURE: 98.1 F | HEART RATE: 62 BPM | OXYGEN SATURATION: 96 % | SYSTOLIC BLOOD PRESSURE: 152 MMHG | RESPIRATION RATE: 18 BRPM | WEIGHT: 155 LBS

## 2025-02-16 DIAGNOSIS — S92.354A CLOSED NONDISPLACED FRACTURE OF FIFTH METATARSAL BONE OF RIGHT FOOT, INITIAL ENCOUNTER: Primary | ICD-10-CM

## 2025-02-16 PROCEDURE — 73630 X-RAY EXAM OF FOOT: CPT

## 2025-02-16 PROCEDURE — 99211 OFF/OP EST MAY X REQ PHY/QHP: CPT

## 2025-02-16 RX ORDER — HYDROCODONE BITARTRATE AND ACETAMINOPHEN 5; 325 MG/1; MG/1
1 TABLET ORAL EVERY 6 HOURS PRN
Qty: 12 TABLET | Refills: 0 | Status: SHIPPED | OUTPATIENT
Start: 2025-02-16 | End: 2025-02-19

## 2025-02-16 ASSESSMENT — PAIN DESCRIPTION - ORIENTATION: ORIENTATION: RIGHT

## 2025-02-16 ASSESSMENT — PAIN DESCRIPTION - LOCATION: LOCATION: FOOT

## 2025-02-16 ASSESSMENT — PAIN - FUNCTIONAL ASSESSMENT: PAIN_FUNCTIONAL_ASSESSMENT: NONE - DENIES PAIN

## 2025-02-16 NOTE — ED PROVIDER NOTES
Independent DAVEY Visit.    HPI:  25,   Time: 8:18 AM JACKLYN Marsh is a 64 y.o. female presenting to the ED for right foot pain.  Yesterday patient tripped over her boots while she was walking and twisted her right foot.  She reports increased pain when she is ambulating.  Sharp intermittent pains of the midfoot.  She took an ibuprofen for her pain.  Still ambulatory.  No numbness or tingling.  No obvious deformity or tenting.    ROS:   Pertinent positives and negatives are stated within HPI, all other systems reviewed and are negative.  --------------------------------------------- PAST HISTORY ---------------------------------------------  Past Medical History:  has a past medical history of Hypertension and Hypothyroidism.    Past Surgical History:  has a past surgical history that includes Endometrial ablation; Tubal ligation;  section; Partial hysterectomy; Colonoscopy; and Hysterectomy.    Social History:  reports that she quit smoking about 45 years ago. Her smoking use included cigarettes. She started smoking about 46 years ago. She has a 0.1 pack-year smoking history. She has never used smokeless tobacco. She reports current alcohol use. She reports that she does not currently use drugs.    Family History: family history includes BRCA 1 Negative in her daughter and mother; BRCA 2 Negative in her daughter and mother; Breast Cancer in her maternal aunt and maternal aunt; Breast Cancer (age of onset: 55) in her mother; Breast Cancer (age of onset: 60) in her sister; Colon Cancer in her maternal uncle and maternal uncle; Ovarian Cancer (age of onset: 57) in her sister; Pancreatic Cancer in her maternal uncle; Uterine Cancer (age of onset: 66) in her mother.     The patient’s home medications have been reviewed.    Allergies: Patient has no known allergies.    -------------------------------------------------- RESULTS -------------------------------------------------  All laboratory

## 2025-02-16 NOTE — ED NOTES
Pt declined crutches. Pt stated \"I'm not going use those.\" Pt educated per provider on importance of non weightbearing status without effect.      Amna Lyons RN  02/16/25 0916

## 2025-04-01 NOTE — PROGRESS NOTES
of the bilateral breast prior.  At this time, we will plan to see her in 6 months.  She is hesitant to agree upon breast MRI imaging, stating \"I have watched my mother and sister go through this and I would not want to go through what they did\".  We discussed the goal of enhanced screening is to detect any breast related changes early.  I have offered her an appointment with any of my physician practice partners, Dr. Mendes, Dr. Ward, or Dr. Bautista.  I have also offered her a referral to plastics and reconstructive surgery in the event she would like to discuss prophylactic surgery.  At this time, she has declined both offers and will let us know in the event she changes her mind. Family history of colon cancer, she reports she is up to date on her screening colonoscopies.    04/18/2024 clinical follow up: Is without secondary evidence of malignancy.  Her bilateral breast exam remains clinically stable.  Her IBS risk score remains elevated at 25% however, her breast exam is overall straightforward and without significant density.  She has grade B breast tissue density on imaging she has declined MRI screening secondary to financial constraints.  We discussed the option of [facility] program assistance but she is not interested at this time.  We discussed the consideration for bilateral complete breast ultrasounds however, she prefers mammography screening annually.  She has had time to review the information on chemoprevention but is not interested at this time.  She was encouraged to contact us at any time should she change her mind.  We will plan to see her in 6 months for clinical exam and as needed.  We discussed the importance of self-exam and calling in the event she would notice any changes.    10/17/2024 clinical follow up: Overall, her bilateral breast exam remains clinically stable and without secondary evidence of malignancy or disease.  Her breast exam is relatively straightforward with supple breast tissue

## 2025-04-14 ENCOUNTER — OFFICE VISIT (OUTPATIENT)
Dept: FAMILY MEDICINE CLINIC | Age: 65
End: 2025-04-14

## 2025-04-14 VITALS
OXYGEN SATURATION: 97 % | DIASTOLIC BLOOD PRESSURE: 65 MMHG | BODY MASS INDEX: 23.71 KG/M2 | SYSTOLIC BLOOD PRESSURE: 130 MMHG | HEART RATE: 73 BPM | WEIGHT: 151.4 LBS | TEMPERATURE: 98.1 F

## 2025-04-14 DIAGNOSIS — I10 ESSENTIAL HYPERTENSION: Primary | ICD-10-CM

## 2025-04-14 SDOH — ECONOMIC STABILITY: TRANSPORTATION INSECURITY
IN THE PAST 12 MONTHS, HAS LACK OF TRANSPORTATION KEPT YOU FROM MEETINGS, WORK, OR FROM GETTING THINGS NEEDED FOR DAILY LIVING?: NO

## 2025-04-14 SDOH — ECONOMIC STABILITY: INCOME INSECURITY: IN THE LAST 12 MONTHS, WAS THERE A TIME WHEN YOU WERE NOT ABLE TO PAY THE MORTGAGE OR RENT ON TIME?: NO

## 2025-04-14 SDOH — ECONOMIC STABILITY: FOOD INSECURITY: WITHIN THE PAST 12 MONTHS, YOU WORRIED THAT YOUR FOOD WOULD RUN OUT BEFORE YOU GOT MONEY TO BUY MORE.: NEVER TRUE

## 2025-04-14 SDOH — ECONOMIC STABILITY: FOOD INSECURITY: WITHIN THE PAST 12 MONTHS, THE FOOD YOU BOUGHT JUST DIDN'T LAST AND YOU DIDN'T HAVE MONEY TO GET MORE.: NEVER TRUE

## 2025-04-14 SDOH — ECONOMIC STABILITY: TRANSPORTATION INSECURITY
IN THE PAST 12 MONTHS, HAS THE LACK OF TRANSPORTATION KEPT YOU FROM MEDICAL APPOINTMENTS OR FROM GETTING MEDICATIONS?: NO

## 2025-04-14 ASSESSMENT — PATIENT HEALTH QUESTIONNAIRE - PHQ9
1. LITTLE INTEREST OR PLEASURE IN DOING THINGS: NOT AT ALL
2. FEELING DOWN, DEPRESSED OR HOPELESS: NOT AT ALL
SUM OF ALL RESPONSES TO PHQ QUESTIONS 1-9: 0
SUM OF ALL RESPONSES TO PHQ9 QUESTIONS 1 & 2: 0
SUM OF ALL RESPONSES TO PHQ QUESTIONS 1-9: 0
1. LITTLE INTEREST OR PLEASURE IN DOING THINGS: NOT AT ALL
2. FEELING DOWN, DEPRESSED OR HOPELESS: NOT AT ALL

## 2025-04-14 NOTE — PROGRESS NOTES
Chief Complaint:  Hypertension (Recent high at-home BP readings)    History of Present Illness:  Source of history provided by:  patient.    Patient presents for elevated BP with home readings  BP cuff was reading 166/70 this weekend, 147/77 this morning  Had some facial flushing 2 days ago  On hctz 12.5mg; cozaar 100mg  Has had increased stress recently- uncertainly with her job and her daughter's job  Doesn't think she has had increased salt   BP cuff is a few years old   Denies CP or SOB    Review of Systems: Unless otherwise stated in this report or unable to obtain because of the patient's clinical or mental status as evidenced by the medical record, this patients's positive and negative responses for Review of Systems, constitutional, psych, eyes, ENT, cardiovascular, respiratory, gastrointestinal, neurological, genitourinary, musculoskeletal, integument systems and systems related to the presenting problem are either stated in the preceding or were not pertinent or were negative for the symptoms and/or complaints related to the medical problem.    Past Medical History:  has a past medical history of Hypertension and Hypothyroidism.  Past Surgical History:  has a past surgical history that includes Endometrial ablation; Tubal ligation;  section; Partial hysterectomy; Colonoscopy; and Hysterectomy.  Social History:  reports that she quit smoking about 45 years ago. Her smoking use included cigarettes. She started smoking about 46 years ago. She has a 0.1 pack-year smoking history. She has never used smokeless tobacco. She reports current alcohol use. She reports that she does not currently use drugs.  Family History: family history includes BRCA 1 Negative in her daughter and mother; BRCA 2 Negative in her daughter and mother; Breast Cancer in her maternal aunt and maternal aunt; Breast Cancer (age of onset: 55) in her mother; Breast Cancer (age of onset: 60) in her sister; Colon Cancer in her maternal

## 2025-04-23 ENCOUNTER — HOSPITAL ENCOUNTER (OUTPATIENT)
Dept: GENERAL RADIOLOGY | Age: 65
Discharge: HOME | End: 2025-04-25
Payer: COMMERCIAL

## 2025-04-23 ENCOUNTER — OFFICE VISIT (OUTPATIENT)
Dept: BREAST CENTER | Age: 65
End: 2025-04-23
Payer: COMMERCIAL

## 2025-04-23 VITALS
DIASTOLIC BLOOD PRESSURE: 64 MMHG | OXYGEN SATURATION: 100 % | RESPIRATION RATE: 22 BRPM | BODY MASS INDEX: 24.11 KG/M2 | WEIGHT: 150 LBS | HEIGHT: 66 IN | SYSTOLIC BLOOD PRESSURE: 118 MMHG | HEART RATE: 63 BPM | TEMPERATURE: 98.3 F

## 2025-04-23 VITALS — WEIGHT: 150 LBS | HEIGHT: 66 IN | BODY MASS INDEX: 24.11 KG/M2

## 2025-04-23 DIAGNOSIS — Z91.89 AT HIGH RISK FOR BREAST CANCER: Primary | ICD-10-CM

## 2025-04-23 DIAGNOSIS — Z12.31 VISIT FOR SCREENING MAMMOGRAM: ICD-10-CM

## 2025-04-23 PROCEDURE — 99213 OFFICE O/P EST LOW 20 MIN: CPT | Performed by: NURSE PRACTITIONER

## 2025-04-23 PROCEDURE — 77063 BREAST TOMOSYNTHESIS BI: CPT

## 2025-04-23 PROCEDURE — 3074F SYST BP LT 130 MM HG: CPT | Performed by: NURSE PRACTITIONER

## 2025-04-23 PROCEDURE — 3078F DIAST BP <80 MM HG: CPT | Performed by: NURSE PRACTITIONER

## 2025-04-23 ASSESSMENT — ENCOUNTER SYMPTOMS
APNEA: 0
BLOOD IN STOOL: 0
STRIDOR: 0
RECTAL PAIN: 0

## 2025-05-01 ENCOUNTER — RESULTS FOLLOW-UP (OUTPATIENT)
Dept: FAMILY MEDICINE CLINIC | Age: 65
End: 2025-05-01

## 2025-05-01 ENCOUNTER — HOSPITAL ENCOUNTER (OUTPATIENT)
Age: 65
Discharge: HOME | End: 2025-05-01
Payer: COMMERCIAL

## 2025-05-01 DIAGNOSIS — E06.3 HYPOTHYROIDISM DUE TO HASHIMOTO'S THYROIDITIS: ICD-10-CM

## 2025-05-01 DIAGNOSIS — E55.9 VITAMIN D DEFICIENCY, UNSPECIFIED: ICD-10-CM

## 2025-05-01 DIAGNOSIS — I10 ESSENTIAL HYPERTENSION: ICD-10-CM

## 2025-05-01 DIAGNOSIS — E87.5 HYPERKALEMIA: Primary | ICD-10-CM

## 2025-05-01 DIAGNOSIS — R73.9 ELEVATED BLOOD SUGAR: ICD-10-CM

## 2025-05-01 LAB
25(OH)D3 SERPL-MCNC: 84.2 NG/ML (ref 30–100)
ALBUMIN SERPL-MCNC: 4.2 G/DL (ref 3.5–5.2)
ALP SERPL-CCNC: 77 U/L (ref 35–104)
ALT SERPL-CCNC: 17 U/L (ref 0–32)
ANION GAP SERPL CALCULATED.3IONS-SCNC: 11 MMOL/L (ref 7–16)
AST SERPL-CCNC: 23 U/L (ref 0–31)
BASOPHILS # BLD: 0.04 K/UL (ref 0–0.2)
BASOPHILS NFR BLD: 1 % (ref 0–2)
BILIRUB SERPL-MCNC: 0.8 MG/DL (ref 0–1.2)
BUN SERPL-MCNC: 17 MG/DL (ref 6–23)
CALCIUM SERPL-MCNC: 10 MG/DL (ref 8.6–10.2)
CHLORIDE SERPL-SCNC: 104 MMOL/L (ref 98–107)
CHOLEST SERPL-MCNC: 165 MG/DL
CO2 SERPL-SCNC: 26 MMOL/L (ref 22–29)
CREAT SERPL-MCNC: 0.7 MG/DL (ref 0.5–1)
EOSINOPHIL # BLD: 0.1 K/UL (ref 0.05–0.5)
EOSINOPHILS RELATIVE PERCENT: 2 % (ref 0–6)
ERYTHROCYTE [DISTWIDTH] IN BLOOD BY AUTOMATED COUNT: 12.8 % (ref 11.5–15)
GFR, ESTIMATED: >90 ML/MIN/1.73M2
GLUCOSE SERPL-MCNC: 110 MG/DL (ref 74–99)
HCT VFR BLD AUTO: 40.6 % (ref 34–48)
HDLC SERPL-MCNC: 69 MG/DL
HGB BLD-MCNC: 13.5 G/DL (ref 11.5–15.5)
IMM GRANULOCYTES # BLD AUTO: 0.03 K/UL (ref 0–0.58)
IMM GRANULOCYTES NFR BLD: 1 % (ref 0–5)
LDLC SERPL CALC-MCNC: 83 MG/DL
LYMPHOCYTES NFR BLD: 1.14 K/UL (ref 1.5–4)
LYMPHOCYTES RELATIVE PERCENT: 17 % (ref 20–42)
MCH RBC QN AUTO: 30.6 PG (ref 26–35)
MCHC RBC AUTO-ENTMCNC: 33.3 G/DL (ref 32–34.5)
MCV RBC AUTO: 92.1 FL (ref 80–99.9)
MONOCYTES NFR BLD: 0.47 K/UL (ref 0.1–0.95)
MONOCYTES NFR BLD: 7 % (ref 2–12)
NEUTROPHILS NFR BLD: 73 % (ref 43–80)
NEUTS SEG NFR BLD: 4.77 K/UL (ref 1.8–7.3)
PLATELET, FLUORESCENCE: 238 K/UL (ref 130–450)
PMV BLD AUTO: 10.3 FL (ref 7–12)
POTASSIUM SERPL-SCNC: 5.1 MMOL/L (ref 3.5–5)
PROT SERPL-MCNC: 7 G/DL (ref 6.4–8.3)
RBC # BLD AUTO: 4.41 M/UL (ref 3.5–5.5)
SODIUM SERPL-SCNC: 141 MMOL/L (ref 132–146)
TRIGL SERPL-MCNC: 65 MG/DL
TSH SERPL DL<=0.05 MIU/L-ACNC: 1.78 UIU/ML (ref 0.27–4.2)
VLDLC SERPL CALC-MCNC: 13 MG/DL
WBC OTHER # BLD: 6.6 K/UL (ref 4.5–11.5)

## 2025-05-01 PROCEDURE — 85025 COMPLETE CBC W/AUTO DIFF WBC: CPT

## 2025-05-01 PROCEDURE — 80053 COMPREHEN METABOLIC PANEL: CPT

## 2025-05-01 PROCEDURE — 80061 LIPID PANEL: CPT

## 2025-05-01 PROCEDURE — 82306 VITAMIN D 25 HYDROXY: CPT

## 2025-05-01 PROCEDURE — 84443 ASSAY THYROID STIM HORMONE: CPT

## 2025-05-15 ENCOUNTER — HOSPITAL ENCOUNTER (OUTPATIENT)
Age: 65
Discharge: HOME | End: 2025-05-15
Payer: COMMERCIAL

## 2025-05-15 DIAGNOSIS — R73.9 ELEVATED BLOOD SUGAR: ICD-10-CM

## 2025-05-15 DIAGNOSIS — E87.5 HYPERKALEMIA: ICD-10-CM

## 2025-05-15 LAB
ANION GAP SERPL CALCULATED.3IONS-SCNC: 12 MMOL/L (ref 7–16)
BUN SERPL-MCNC: 15 MG/DL (ref 6–23)
CALCIUM SERPL-MCNC: 9.4 MG/DL (ref 8.6–10.2)
CHLORIDE SERPL-SCNC: 104 MMOL/L (ref 98–107)
CO2 SERPL-SCNC: 23 MMOL/L (ref 22–29)
CREAT SERPL-MCNC: 0.7 MG/DL (ref 0.5–1)
GFR, ESTIMATED: >90 ML/MIN/1.73M2
GLUCOSE SERPL-MCNC: 94 MG/DL (ref 74–99)
HBA1C MFR BLD: 5.3 % (ref 4–5.6)
POTASSIUM SERPL-SCNC: 4.4 MMOL/L (ref 3.5–5)
SODIUM SERPL-SCNC: 139 MMOL/L (ref 132–146)

## 2025-05-15 PROCEDURE — 80048 BASIC METABOLIC PNL TOTAL CA: CPT

## 2025-05-15 PROCEDURE — 83036 HEMOGLOBIN GLYCOSYLATED A1C: CPT

## 2025-05-15 PROCEDURE — 36415 COLL VENOUS BLD VENIPUNCTURE: CPT

## 2025-05-16 ENCOUNTER — OFFICE VISIT (OUTPATIENT)
Dept: FAMILY MEDICINE CLINIC | Age: 65
End: 2025-05-16

## 2025-05-16 VITALS
WEIGHT: 144.6 LBS | TEMPERATURE: 97.5 F | SYSTOLIC BLOOD PRESSURE: 122 MMHG | DIASTOLIC BLOOD PRESSURE: 74 MMHG | OXYGEN SATURATION: 99 % | HEART RATE: 62 BPM | HEIGHT: 66 IN | RESPIRATION RATE: 18 BRPM | BODY MASS INDEX: 23.24 KG/M2

## 2025-05-16 DIAGNOSIS — E03.9 ACQUIRED HYPOTHYROIDISM: ICD-10-CM

## 2025-05-16 DIAGNOSIS — E78.5 HYPERLIPIDEMIA, UNSPECIFIED HYPERLIPIDEMIA TYPE: ICD-10-CM

## 2025-05-16 DIAGNOSIS — J45.20 MILD INTERMITTENT ASTHMA WITHOUT COMPLICATION: ICD-10-CM

## 2025-05-16 DIAGNOSIS — E06.3 HYPOTHYROIDISM DUE TO HASHIMOTO'S THYROIDITIS: ICD-10-CM

## 2025-05-16 DIAGNOSIS — K58.1 IRRITABLE BOWEL SYNDROME WITH CONSTIPATION: ICD-10-CM

## 2025-05-16 DIAGNOSIS — I10 ESSENTIAL HYPERTENSION: Primary | ICD-10-CM

## 2025-05-16 RX ORDER — MONTELUKAST SODIUM 10 MG/1
10 TABLET ORAL DAILY
Qty: 90 TABLET | Refills: 1 | Status: SHIPPED | OUTPATIENT
Start: 2025-05-16

## 2025-05-16 RX ORDER — LEVOTHYROXINE SODIUM 75 UG/1
75 TABLET ORAL DAILY
Qty: 90 TABLET | Refills: 1 | Status: SHIPPED | OUTPATIENT
Start: 2025-05-16

## 2025-05-16 RX ORDER — LINACLOTIDE 290 UG/1
290 CAPSULE, GELATIN COATED ORAL
Qty: 90 CAPSULE | Refills: 1 | Status: SHIPPED | OUTPATIENT
Start: 2025-05-16

## 2025-05-16 RX ORDER — LOSARTAN POTASSIUM AND HYDROCHLOROTHIAZIDE 12.5; 1 MG/1; MG/1
1 TABLET ORAL DAILY
Qty: 90 TABLET | Refills: 1 | Status: SHIPPED | OUTPATIENT
Start: 2025-05-16

## 2025-05-16 NOTE — PROGRESS NOTES
CC: Mary Alice Marsh is a 64 y.o. yo female is here for evaluation evaluation for the following chronic medical concerns: Hypertension, 4 month follow up, Hypothyroidism, and Discuss Labs        HPI:      HTN: on hctz 12.5mg; cozaar 100mg; hx of hyperkalemia; last labs wnl - same  HLD: ASCVD 3.8%; not on medical therapy  Hypothyroidism - on synthroid 75mcg  Osteopenia: on ca/d; last DEXA **  GERD / HH: controlled off of medical therapy -- per previous  IBS with constipation: linzess daily; fiber; feels controlled  Mild bronchospasm:  controlled on singular 10mg daily  Last c-scope 2014; Dr. Antony; 10 year interval; strong FH of colon cancer and other cancers; now completed c-scope  March 2024 with Dr. Woodruff which was normal; recommended 10 year but due to family history prefer 5 year     Strong FH of cancer / breast ca  / ovarian cancer; did have f/u with [facility]; sister and daughter with neg genetic testing; she prefers no additional imaging such as MRI breast or ultrasound at this time; prefers annual mammo; she has f/u planned with TM in 1 year  Preventive: scattered densities; TC risk **  Has not followed with gyn Dr. Hogan for some time; not sure of last pap -- per previous  Hx of partial hyster for fibroids      Vitals:   /74 (BP Site: Right Upper Arm, Patient Position: Sitting)   Pulse 62   Temp 97.5 °F (36.4 °C)   Resp 18   Ht 1.676 m (5' 6\")   Wt 65.6 kg (144 lb 9.6 oz)   SpO2 99%   BMI 23.34 kg/m²   Wt Readings from Last 3 Encounters:   05/16/25 65.6 kg (144 lb 9.6 oz)   04/23/25 68 kg (150 lb)   04/23/25 68 kg (150 lb)       PE:  Constitutional - alert, well appearing, and in no distress  Eyes - extraocular eye movements intact, left eye normal, right eye normal, no conjunctivitis noted  Neck - symmetric, no obvious masses noted  Respiratory- clear to auscultation, no wheezes, rales or rhonchi, symmetric air entry; no increased work of breathing  Cardiovascular - normal rate, regular